# Patient Record
Sex: FEMALE | Race: WHITE | Employment: FULL TIME | URBAN - METROPOLITAN AREA
[De-identification: names, ages, dates, MRNs, and addresses within clinical notes are randomized per-mention and may not be internally consistent; named-entity substitution may affect disease eponyms.]

---

## 2017-03-29 ENCOUNTER — ANESTHESIA EVENT (OUTPATIENT)
Dept: SURGERY | Facility: CLINIC | Age: 57
End: 2017-03-29
Payer: COMMERCIAL

## 2017-04-17 RX ORDER — LIRAGLUTIDE 6 MG/ML
1.2 INJECTION SUBCUTANEOUS DAILY
COMMUNITY

## 2017-04-17 RX ORDER — SULFASALAZINE 500 MG/1
500 TABLET ORAL 2 TIMES DAILY
COMMUNITY

## 2017-04-19 ENCOUNTER — HOSPITAL ENCOUNTER (OUTPATIENT)
Facility: CLINIC | Age: 57
Discharge: HOME | End: 2017-04-19
Attending: ORTHOPAEDIC SURGERY | Admitting: ORTHOPAEDIC SURGERY
Payer: COMMERCIAL

## 2017-04-19 ENCOUNTER — ANESTHESIA (OUTPATIENT)
Dept: SURGERY | Facility: CLINIC | Age: 57
End: 2017-04-19
Payer: COMMERCIAL

## 2017-04-19 ENCOUNTER — SURGERY (OUTPATIENT)
Age: 57
End: 2017-04-19

## 2017-04-19 VITALS
BODY MASS INDEX: 44.47 KG/M2 | HEART RATE: 70 BPM | SYSTOLIC BLOOD PRESSURE: 117 MMHG | OXYGEN SATURATION: 96 % | WEIGHT: 276.68 LBS | DIASTOLIC BLOOD PRESSURE: 76 MMHG | RESPIRATION RATE: 18 BRPM | HEIGHT: 66 IN | TEMPERATURE: 98.1 F

## 2017-04-19 LAB
ABO + RH BLD: NORMAL
ABO + RH BLD: NORMAL
ANION GAP SERPL CALCULATED.3IONS-SCNC: 8 MMOL/L (ref 3–14)
BLD GP AB SCN SERPL QL: NORMAL
BLOOD BANK CMNT PATIENT-IMP: NORMAL
BUN SERPL-MCNC: 16 MG/DL (ref 7–30)
CALCIUM SERPL-MCNC: 8.4 MG/DL (ref 8.5–10.1)
CHLORIDE SERPL-SCNC: 106 MMOL/L (ref 94–109)
CO2 SERPL-SCNC: 24 MMOL/L (ref 20–32)
CREAT SERPL-MCNC: 0.59 MG/DL (ref 0.52–1.04)
GFR SERPL CREATININE-BSD FRML MDRD: ABNORMAL ML/MIN/1.7M2
GLUCOSE BLDC GLUCOMTR-MCNC: 209 MG/DL (ref 70–99)
GLUCOSE BLDC GLUCOMTR-MCNC: 222 MG/DL (ref 70–99)
GLUCOSE SERPL-MCNC: 160 MG/DL (ref 70–99)
GLUCOSE SERPL-MCNC: 221 MG/DL (ref 70–99)
HGB BLD-MCNC: 12.3 G/DL (ref 11.7–15.7)
MAGNESIUM SERPL-MCNC: 1.9 MG/DL (ref 1.6–2.3)
PHOSPHATE SERPL-MCNC: 3.4 MG/DL (ref 2.5–4.5)
POTASSIUM SERPL-SCNC: 4.1 MMOL/L (ref 3.4–5.3)
SODIUM SERPL-SCNC: 138 MMOL/L (ref 133–144)
SPECIMEN EXP DATE BLD: NORMAL

## 2017-04-19 PROCEDURE — 37000009 ZZH ANESTHESIA TECHNICAL FEE, EACH ADDTL 15 MIN: Performed by: ORTHOPAEDIC SURGERY

## 2017-04-19 PROCEDURE — 25000125 ZZHC RX 250: Performed by: STUDENT IN AN ORGANIZED HEALTH CARE EDUCATION/TRAINING PROGRAM

## 2017-04-19 PROCEDURE — 25000128 H RX IP 250 OP 636: Performed by: ORTHOPAEDIC SURGERY

## 2017-04-19 PROCEDURE — 40000170 ZZH STATISTIC PRE-PROCEDURE ASSESSMENT II: Performed by: ORTHOPAEDIC SURGERY

## 2017-04-19 PROCEDURE — 36000057 ZZH SURGERY LEVEL 3 1ST 30 MIN - UMMC: Performed by: ORTHOPAEDIC SURGERY

## 2017-04-19 PROCEDURE — 27210794 ZZH OR GENERAL SUPPLY STERILE: Performed by: ORTHOPAEDIC SURGERY

## 2017-04-19 PROCEDURE — 80048 BASIC METABOLIC PNL TOTAL CA: CPT | Performed by: STUDENT IN AN ORGANIZED HEALTH CARE EDUCATION/TRAINING PROGRAM

## 2017-04-19 PROCEDURE — 84100 ASSAY OF PHOSPHORUS: CPT | Performed by: STUDENT IN AN ORGANIZED HEALTH CARE EDUCATION/TRAINING PROGRAM

## 2017-04-19 PROCEDURE — 71000014 ZZH RECOVERY PHASE 1 LEVEL 2 FIRST HR: Performed by: ORTHOPAEDIC SURGERY

## 2017-04-19 PROCEDURE — 82962 GLUCOSE BLOOD TEST: CPT | Mod: 91

## 2017-04-19 PROCEDURE — 25000128 H RX IP 250 OP 636: Performed by: REGISTERED NURSE

## 2017-04-19 PROCEDURE — 36000059 ZZH SURGERY LEVEL 3 EA 15 ADDTL MIN UMMC: Performed by: ORTHOPAEDIC SURGERY

## 2017-04-19 PROCEDURE — 25000132 ZZH RX MED GY IP 250 OP 250 PS 637: Performed by: STUDENT IN AN ORGANIZED HEALTH CARE EDUCATION/TRAINING PROGRAM

## 2017-04-19 PROCEDURE — 86850 RBC ANTIBODY SCREEN: CPT | Performed by: STUDENT IN AN ORGANIZED HEALTH CARE EDUCATION/TRAINING PROGRAM

## 2017-04-19 PROCEDURE — 93010 ELECTROCARDIOGRAM REPORT: CPT | Performed by: INTERNAL MEDICINE

## 2017-04-19 PROCEDURE — 40000065 ZZH STATISTIC EKG NON-CHARGEABLE

## 2017-04-19 PROCEDURE — C9290 INJ, BUPIVACAINE LIPOSOME: HCPCS | Performed by: STUDENT IN AN ORGANIZED HEALTH CARE EDUCATION/TRAINING PROGRAM

## 2017-04-19 PROCEDURE — 27210995 ZZH RX 272: Performed by: ORTHOPAEDIC SURGERY

## 2017-04-19 PROCEDURE — 86901 BLOOD TYPING SEROLOGIC RH(D): CPT | Performed by: STUDENT IN AN ORGANIZED HEALTH CARE EDUCATION/TRAINING PROGRAM

## 2017-04-19 PROCEDURE — 85018 HEMOGLOBIN: CPT | Performed by: STUDENT IN AN ORGANIZED HEALTH CARE EDUCATION/TRAINING PROGRAM

## 2017-04-19 PROCEDURE — 25800025 ZZH RX 258: Performed by: REGISTERED NURSE

## 2017-04-19 PROCEDURE — 86900 BLOOD TYPING SEROLOGIC ABO: CPT | Performed by: STUDENT IN AN ORGANIZED HEALTH CARE EDUCATION/TRAINING PROGRAM

## 2017-04-19 PROCEDURE — 25000566 ZZH SEVOFLURANE, EA 15 MIN: Performed by: ORTHOPAEDIC SURGERY

## 2017-04-19 PROCEDURE — 71000027 ZZH RECOVERY PHASE 2 EACH 15 MINS: Performed by: ORTHOPAEDIC SURGERY

## 2017-04-19 PROCEDURE — 27110028 ZZH OR GENERAL SUPPLY NON-STERILE: Performed by: ORTHOPAEDIC SURGERY

## 2017-04-19 PROCEDURE — 25000128 H RX IP 250 OP 636: Performed by: STUDENT IN AN ORGANIZED HEALTH CARE EDUCATION/TRAINING PROGRAM

## 2017-04-19 PROCEDURE — 36415 COLL VENOUS BLD VENIPUNCTURE: CPT | Performed by: STUDENT IN AN ORGANIZED HEALTH CARE EDUCATION/TRAINING PROGRAM

## 2017-04-19 PROCEDURE — 36416 COLLJ CAPILLARY BLOOD SPEC: CPT | Performed by: STUDENT IN AN ORGANIZED HEALTH CARE EDUCATION/TRAINING PROGRAM

## 2017-04-19 PROCEDURE — 71000015 ZZH RECOVERY PHASE 1 LEVEL 2 EA ADDTL HR: Performed by: ORTHOPAEDIC SURGERY

## 2017-04-19 PROCEDURE — 83735 ASSAY OF MAGNESIUM: CPT | Performed by: STUDENT IN AN ORGANIZED HEALTH CARE EDUCATION/TRAINING PROGRAM

## 2017-04-19 PROCEDURE — 37000008 ZZH ANESTHESIA TECHNICAL FEE, 1ST 30 MIN: Performed by: ORTHOPAEDIC SURGERY

## 2017-04-19 PROCEDURE — C1713 ANCHOR/SCREW BN/BN,TIS/BN: HCPCS | Performed by: ORTHOPAEDIC SURGERY

## 2017-04-19 PROCEDURE — 82947 ASSAY GLUCOSE BLOOD QUANT: CPT | Performed by: STUDENT IN AN ORGANIZED HEALTH CARE EDUCATION/TRAINING PROGRAM

## 2017-04-19 PROCEDURE — 25000125 ZZHC RX 250: Performed by: REGISTERED NURSE

## 2017-04-19 DEVICE — IMP ANCHOR ARTHREX BIO-SWIVELOCK 4.75X22MM AR-2324BCC-2: Type: IMPLANTABLE DEVICE | Site: SHOULDER | Status: FUNCTIONAL

## 2017-04-19 DEVICE — IMP ANCHOR ARTHREX 5.5MM BIOCOMP CRKSCR TIGRTL AR-1927BCFT: Type: IMPLANTABLE DEVICE | Site: SHOULDER | Status: FUNCTIONAL

## 2017-04-19 RX ORDER — ACETAMINOPHEN 325 MG/1
975 TABLET ORAL ONCE
Status: COMPLETED | OUTPATIENT
Start: 2017-04-19 | End: 2017-04-19

## 2017-04-19 RX ORDER — FENTANYL CITRATE 50 UG/ML
INJECTION, SOLUTION INTRAMUSCULAR; INTRAVENOUS PRN
Status: DISCONTINUED | OUTPATIENT
Start: 2017-04-19 | End: 2017-04-19

## 2017-04-19 RX ORDER — DEXAMETHASONE SODIUM PHOSPHATE 4 MG/ML
INJECTION, SOLUTION INTRA-ARTICULAR; INTRALESIONAL; INTRAMUSCULAR; INTRAVENOUS; SOFT TISSUE PRN
Status: DISCONTINUED | OUTPATIENT
Start: 2017-04-19 | End: 2017-04-19

## 2017-04-19 RX ORDER — HYDROXYZINE PAMOATE 25 MG/1
25 CAPSULE ORAL 3 TIMES DAILY PRN
Qty: 30 CAPSULE | Refills: 0 | Status: SHIPPED | OUTPATIENT
Start: 2017-04-19 | End: 2019-12-13

## 2017-04-19 RX ORDER — HYDROMORPHONE HYDROCHLORIDE 1 MG/ML
.3-.5 INJECTION, SOLUTION INTRAMUSCULAR; INTRAVENOUS; SUBCUTANEOUS EVERY 10 MIN PRN
Status: DISCONTINUED | OUTPATIENT
Start: 2017-04-19 | End: 2017-04-19 | Stop reason: HOSPADM

## 2017-04-19 RX ORDER — BUPIVACAINE HYDROCHLORIDE AND EPINEPHRINE 2.5; 5 MG/ML; UG/ML
INJECTION, SOLUTION INFILTRATION; PERINEURAL PRN
Status: DISCONTINUED | OUTPATIENT
Start: 2017-04-19 | End: 2017-04-19

## 2017-04-19 RX ORDER — GABAPENTIN 300 MG/1
300 CAPSULE ORAL ONCE
Status: COMPLETED | OUTPATIENT
Start: 2017-04-19 | End: 2017-04-19

## 2017-04-19 RX ORDER — ONDANSETRON 2 MG/ML
4 INJECTION INTRAMUSCULAR; INTRAVENOUS EVERY 30 MIN PRN
Status: DISCONTINUED | OUTPATIENT
Start: 2017-04-19 | End: 2017-04-19 | Stop reason: HOSPADM

## 2017-04-19 RX ORDER — HYDROMORPHONE HYDROCHLORIDE 2 MG/1
2-4 TABLET ORAL EVERY 4 HOURS PRN
Qty: 60 TABLET | Refills: 0 | Status: SHIPPED | OUTPATIENT
Start: 2017-04-19 | End: 2019-12-13

## 2017-04-19 RX ORDER — ONDANSETRON 4 MG/1
4 TABLET, ORALLY DISINTEGRATING ORAL EVERY 30 MIN PRN
Status: DISCONTINUED | OUTPATIENT
Start: 2017-04-19 | End: 2017-04-19 | Stop reason: HOSPADM

## 2017-04-19 RX ORDER — SODIUM CHLORIDE, SODIUM LACTATE, POTASSIUM CHLORIDE, CALCIUM CHLORIDE 600; 310; 30; 20 MG/100ML; MG/100ML; MG/100ML; MG/100ML
INJECTION, SOLUTION INTRAVENOUS CONTINUOUS PRN
Status: DISCONTINUED | OUTPATIENT
Start: 2017-04-19 | End: 2017-04-19

## 2017-04-19 RX ORDER — NALOXONE HYDROCHLORIDE 0.4 MG/ML
.1-.4 INJECTION, SOLUTION INTRAMUSCULAR; INTRAVENOUS; SUBCUTANEOUS
Status: DISCONTINUED | OUTPATIENT
Start: 2017-04-19 | End: 2017-04-19 | Stop reason: HOSPADM

## 2017-04-19 RX ORDER — SODIUM CHLORIDE, SODIUM LACTATE, POTASSIUM CHLORIDE, CALCIUM CHLORIDE 600; 310; 30; 20 MG/100ML; MG/100ML; MG/100ML; MG/100ML
INJECTION, SOLUTION INTRAVENOUS CONTINUOUS
Status: DISCONTINUED | OUTPATIENT
Start: 2017-04-19 | End: 2017-04-19 | Stop reason: HOSPADM

## 2017-04-19 RX ORDER — MEPERIDINE HYDROCHLORIDE 25 MG/ML
12.5 INJECTION INTRAMUSCULAR; INTRAVENOUS; SUBCUTANEOUS
Status: DISCONTINUED | OUTPATIENT
Start: 2017-04-19 | End: 2017-04-19 | Stop reason: HOSPADM

## 2017-04-19 RX ORDER — CEFAZOLIN SODIUM 1 G/50ML
3 SOLUTION INTRAVENOUS
Status: COMPLETED | OUTPATIENT
Start: 2017-04-19 | End: 2017-04-19

## 2017-04-19 RX ORDER — MORPHINE SULFATE 15 MG/1
15-30 TABLET, FILM COATED, EXTENDED RELEASE ORAL 2 TIMES DAILY
Qty: 20 TABLET | Refills: 0 | Status: SHIPPED | OUTPATIENT
Start: 2017-04-19 | End: 2019-12-13

## 2017-04-19 RX ORDER — FENTANYL CITRATE 50 UG/ML
25-50 INJECTION, SOLUTION INTRAMUSCULAR; INTRAVENOUS
Status: DISCONTINUED | OUTPATIENT
Start: 2017-04-19 | End: 2017-04-19 | Stop reason: HOSPADM

## 2017-04-19 RX ORDER — FLUMAZENIL 0.1 MG/ML
0.2 INJECTION, SOLUTION INTRAVENOUS
Status: DISCONTINUED | OUTPATIENT
Start: 2017-04-19 | End: 2017-04-19 | Stop reason: HOSPADM

## 2017-04-19 RX ORDER — EPHEDRINE SULFATE 50 MG/ML
INJECTION, SOLUTION INTRAMUSCULAR; INTRAVENOUS; SUBCUTANEOUS PRN
Status: DISCONTINUED | OUTPATIENT
Start: 2017-04-19 | End: 2017-04-19

## 2017-04-19 RX ORDER — CEFAZOLIN SODIUM 1 G/3ML
1 INJECTION, POWDER, FOR SOLUTION INTRAMUSCULAR; INTRAVENOUS SEE ADMIN INSTRUCTIONS
Status: DISCONTINUED | OUTPATIENT
Start: 2017-04-19 | End: 2017-04-19 | Stop reason: HOSPADM

## 2017-04-19 RX ORDER — LIDOCAINE HYDROCHLORIDE 20 MG/ML
INJECTION, SOLUTION INFILTRATION; PERINEURAL PRN
Status: DISCONTINUED | OUTPATIENT
Start: 2017-04-19 | End: 2017-04-19

## 2017-04-19 RX ORDER — BUPIVACAINE HYDROCHLORIDE AND EPINEPHRINE 5; 5 MG/ML; UG/ML
INJECTION, SOLUTION PERINEURAL PRN
Status: DISCONTINUED | OUTPATIENT
Start: 2017-04-19 | End: 2017-04-19

## 2017-04-19 RX ORDER — ONDANSETRON 2 MG/ML
INJECTION INTRAMUSCULAR; INTRAVENOUS PRN
Status: DISCONTINUED | OUTPATIENT
Start: 2017-04-19 | End: 2017-04-19

## 2017-04-19 RX ORDER — PROPOFOL 10 MG/ML
INJECTION, EMULSION INTRAVENOUS PRN
Status: DISCONTINUED | OUTPATIENT
Start: 2017-04-19 | End: 2017-04-19

## 2017-04-19 RX ADMIN — PROPOFOL 200 MG: 10 INJECTION, EMULSION INTRAVENOUS at 08:51

## 2017-04-19 RX ADMIN — FENTANYL CITRATE 50 MCG: 50 INJECTION, SOLUTION INTRAMUSCULAR; INTRAVENOUS at 10:25

## 2017-04-19 RX ADMIN — DEXAMETHASONE SODIUM PHOSPHATE 10 MG: 4 INJECTION, SOLUTION INTRAMUSCULAR; INTRAVENOUS at 09:15

## 2017-04-19 RX ADMIN — EPINEPHRINE 3000 ML: 1 INJECTION, SOLUTION, CONCENTRATE INTRAVENOUS at 09:43

## 2017-04-19 RX ADMIN — FENTANYL CITRATE 50 MCG: 50 INJECTION, SOLUTION INTRAMUSCULAR; INTRAVENOUS at 09:00

## 2017-04-19 RX ADMIN — MIDAZOLAM HYDROCHLORIDE 1 MG: 1 INJECTION, SOLUTION INTRAMUSCULAR; INTRAVENOUS at 08:37

## 2017-04-19 RX ADMIN — DEXMEDETOMIDINE 12 MCG: 100 INJECTION, SOLUTION, CONCENTRATE INTRAVENOUS at 09:05

## 2017-04-19 RX ADMIN — Medication 5 MG: at 10:08

## 2017-04-19 RX ADMIN — EPINEPHRINE 3000 ML: 1 INJECTION, SOLUTION, CONCENTRATE INTRAVENOUS at 09:50

## 2017-04-19 RX ADMIN — Medication 1 TABLET: at 12:29

## 2017-04-19 RX ADMIN — SODIUM CHLORIDE, POTASSIUM CHLORIDE, SODIUM LACTATE AND CALCIUM CHLORIDE: 600; 310; 30; 20 INJECTION, SOLUTION INTRAVENOUS at 10:22

## 2017-04-19 RX ADMIN — ACETAMINOPHEN 975 MG: 325 TABLET, FILM COATED ORAL at 07:20

## 2017-04-19 RX ADMIN — HUMAN INSULIN 2 UNITS: 100 INJECTION, SOLUTION SUBCUTANEOUS at 12:35

## 2017-04-19 RX ADMIN — DEXMEDETOMIDINE 4 MCG: 100 INJECTION, SOLUTION, CONCENTRATE INTRAVENOUS at 09:59

## 2017-04-19 RX ADMIN — Medication 3 G: at 09:05

## 2017-04-19 RX ADMIN — LIDOCAINE HYDROCHLORIDE 40 MG: 20 INJECTION, SOLUTION INFILTRATION; PERINEURAL at 08:51

## 2017-04-19 RX ADMIN — BUPIVACAINE 10 ML: 13.3 INJECTION, SUSPENSION, LIPOSOMAL INFILTRATION at 08:20

## 2017-04-19 RX ADMIN — PROPOFOL 50 MG: 10 INJECTION, EMULSION INTRAVENOUS at 08:54

## 2017-04-19 RX ADMIN — MIDAZOLAM HYDROCHLORIDE 1 MG: 1 INJECTION, SOLUTION INTRAMUSCULAR; INTRAVENOUS at 08:19

## 2017-04-19 RX ADMIN — Medication 5 MG: at 09:47

## 2017-04-19 RX ADMIN — Medication 1 TABLET: at 13:06

## 2017-04-19 RX ADMIN — SODIUM CHLORIDE, POTASSIUM CHLORIDE, SODIUM LACTATE AND CALCIUM CHLORIDE: 600; 310; 30; 20 INJECTION, SOLUTION INTRAVENOUS at 08:37

## 2017-04-19 RX ADMIN — PROPOFOL 30 MG: 10 INJECTION, EMULSION INTRAVENOUS at 09:05

## 2017-04-19 RX ADMIN — EPINEPHRINE 2700 ML: 1 INJECTION, SOLUTION, CONCENTRATE INTRAVENOUS at 10:10

## 2017-04-19 RX ADMIN — Medication 5 MG: at 09:46

## 2017-04-19 RX ADMIN — Medication 10 MG: at 09:11

## 2017-04-19 RX ADMIN — BUPIVACAINE HYDROCHLORIDE AND EPINEPHRINE BITARTRATE 10 ML: 5; .005 INJECTION, SOLUTION PERINEURAL at 08:20

## 2017-04-19 RX ADMIN — PHENYLEPHRINE HYDROCHLORIDE 0.3 MCG/KG/MIN: 10 INJECTION, SOLUTION INTRAMUSCULAR; INTRAVENOUS; SUBCUTANEOUS at 09:07

## 2017-04-19 RX ADMIN — EPINEPHRINE 3000 ML: 1 INJECTION, SOLUTION, CONCENTRATE INTRAVENOUS at 10:05

## 2017-04-19 RX ADMIN — BUPIVACAINE HYDROCHLORIDE AND EPINEPHRINE BITARTRATE 5 ML: 2.5; .005 INJECTION, SOLUTION INFILTRATION; PERINEURAL at 08:20

## 2017-04-19 RX ADMIN — Medication 100 MG: at 08:52

## 2017-04-19 RX ADMIN — ONDANSETRON 4 MG: 2 INJECTION INTRAMUSCULAR; INTRAVENOUS at 10:18

## 2017-04-19 RX ADMIN — FENTANYL CITRATE 50 MCG: 50 INJECTION INTRAMUSCULAR; INTRAVENOUS at 08:18

## 2017-04-19 RX ADMIN — GABAPENTIN 300 MG: 300 CAPSULE ORAL at 07:20

## 2017-04-19 RX ADMIN — MIDAZOLAM HYDROCHLORIDE 1 MG: 1 INJECTION, SOLUTION INTRAMUSCULAR; INTRAVENOUS at 08:42

## 2017-04-19 RX ADMIN — FENTANYL CITRATE 100 MCG: 50 INJECTION, SOLUTION INTRAMUSCULAR; INTRAVENOUS at 08:51

## 2017-04-19 RX ADMIN — EPINEPHRINE 3000 ML: 1 INJECTION, SOLUTION, CONCENTRATE INTRAVENOUS at 09:59

## 2017-04-19 RX ADMIN — FENTANYL CITRATE 50 MCG: 50 INJECTION, SOLUTION INTRAMUSCULAR; INTRAVENOUS at 10:17

## 2017-04-19 NOTE — IP AVS SNAPSHOT
[facility]    [address] [facility] [address]    Phone:  [phone]                                       After Visit Summary   4/19/2017    Monika Frazier    MRN: [id number]           After Visit Summary Signature Page     I have received my discharge instructions, and my questions have been answered. I have discussed any challenges I see with this plan with the nurse or doctor.    ..........................................................................................................................................  Patient/Patient Representative Signature      ..........................................................................................................................................  Patient Representative Print Name and Relationship to Patient    ..................................................               ................................................  Date                                            Time    ..........................................................................................................................................  Reviewed by Signature/Title    ...................................................              ..............................................  Date                                                            Time

## 2017-04-19 NOTE — OR NURSING
Labs reviewed with Dr. Jack. New order for 1 time of insulin and plan to recheck BS prior to pt discharging to home. Pt started on her home med dilaudid. Will transfer to phase 2 when bed is available.

## 2017-04-19 NOTE — ANESTHESIA PREPROCEDURE EVALUATION
Anesthesia Evaluation     . Pt has had prior anesthetic. Type: General    History of anesthetic complications    panic attack      ROS/MED HX    ENT/Pulmonary:     (+)sleep apnea, , . .    Neurologic:  - neg neurologic ROS     Cardiovascular:     (+) Dyslipidemia, hypertension----. : . . . :. .       METS/Exercise Tolerance:     Hematologic:         Musculoskeletal:         GI/Hepatic:     (+) GERD       Renal/Genitourinary:         Endo:     (+) type II DM .      Psychiatric:  - neg psychiatric ROS       Infectious Disease:         Malignancy:         Other:                     Physical Exam  Normal systems: cardiovascular and pulmonary    Airway   Mallampati: III  TM distance: >3 FB  Neck ROM: full    Dental     Cardiovascular   Rhythm and rate: regular and normal      Pulmonary    breath sounds clear to auscultation                    Anesthesia Plan      History & Physical Review  History and physical reviewed and following examination; no interval change.    ASA Status:  3 .    NPO Status:  > 6 hours    Plan for General, ETT and Periph. Nerve Block for postop pain with Intravenous and Propofol induction. Maintenance will be Balanced.    PONV prophylaxis:  Ondansetron (or other 5HT-3) and Dexamethasone or Solumedrol       Postoperative Care  Postoperative pain management:  IV analgesics, Oral pain medications and Multi-modal analgesia.      Consents  Anesthetic plan, risks, benefits and alternatives discussed with:  Patient..          ANESTHESIA PREOP EVALUATION    PROCEDURE: Procedure(s):  Left Rotator Cuff Repair Arthroscopic, Subacromial Decompression (choice anesthesia)  - Wound Class:     HPI: Monika Frazier is a 56 year old female who presents for above procedure. Hx of previous interscalene block    12/23/15; 0804; Mask Ventilation: Easy; Ease of Intubation: Easy; Airway Size: 7.5;  Cuffed;  Oral;  Blade Type: Busby;  Blade Size: 2;  Insertion Attempts: 1;  Breath Sounds: Equal, clear and bilateral;   End Tidal CO2: Present;  Dentition: Intact    PAST MEDICAL HISTORY:    Past Medical History:   Diagnosis Date     ADD (attention deficit disorder)      Adenomatous colon polyp      Anemia     Iron deficiency     Arthritis     Rheumatoid     Complication of anesthesia     panic attack post op     Depression      Diabetes (H)     Type 2     GERD (gastroesophageal reflux disease)      Hypertension      Liver disease     fatty liver disease     Osteoarthritis      Other chronic pain      Sleep apnea     uses CPAP       PAST SURGICAL HISTORY:    Past Surgical History:   Procedure Laterality Date     ARTHROSCOPY SHOULDER BICEPS TENODESIS REPAIR Right 12/23/2015    Procedure: ARTHROSCOPY SHOULDER BICEPS TENODESIS REPAIR;  Surgeon: Emerson Rucker MD;  Location: [facility]     ARTHROSCOPY SHOULDER ROTATOR CUFF REPAIR Right 12/23/2015    Procedure: ARTHROSCOPY SHOULDER ROTATOR CUFF REPAIR;  Surgeon: Emerson Rucker MD;  Location: [facility]     BACK SURGERY      Lumbar spine decompression     GYN SURGERY      Hysterectomy     ORTHOPEDIC SURGERY Left     Foot surgery       PAST ANESTHESIA HISTORY:     No personal or family h/o anesthesia problems    SOCIAL HISTORY:       Social History   Substance Use Topics     Smoking status: Never Smoker     Smokeless tobacco: Not on file     Alcohol use Yes      Comment: rare       ALLERGIES:     Allergies   Allergen Reactions     Ketoprofen Hives     Pravastatin      Myalgias     Simvastatin      Myalgias       MEDICATIONS:     No prescriptions prior to admission.       Current Outpatient Prescriptions   Medication Sig Dispense Refill     adalimumab (HUMIRA) 40 MG/0.8ML prefilled syringe kit Inject 40 mg Subcutaneous every 14 days       liraglutide (VICTOZA) 18 MG/3ML soln Inject 1.2 mg Subcutaneous daily       sulfaSALAzine (AZULFIDINE) 500 MG tablet Take 500 mg by mouth 2 times daily 2 in am and 2 pm       FLUOXETINE HCL PO Take 20 mg by mouth daily       Celecoxib (CELEBREX  PO) Take 200 mg by mouth daily as needed for moderate pain       LISINOPRIL PO Take 20 mg by mouth daily       metFORMIN (GLUCOPHAGE-XR) 500 MG 24 hr tablet Take 1,000 mg by mouth 2 times daily (with meals)       Multiple Vitamins-Minerals (MULTIVITAMIN PO) Take 1 tablet by mouth daily       Omega-3 Fatty Acids (OMEGA-3 FISH OIL PO) Take 1 g by mouth daily       Omeprazole Magnesium (PRILOSEC OTC PO) Take 20 mg by mouth daily       TraMADol HCl (ULTRAM PO) Take  mg by mouth every 6 hours as needed for pain       TRAZODONE HCL PO Take 50 mg by mouth 1-2 tablets nightly       Glucose Blood (ACCU-CHEK STACEY VI)        fluticasone (FLONASE) 50 MCG/ACT nasal spray Spray 2 sprays into both nostrils daily as needed for rhinitis or allergies       insulin pen needle 32G X 4 MM Use ** pen needles daily or as directed.       blood glucose monitoring (ACCU-CHEK MULTICLIX) lancets 1 each by In Vitro route Use to test blood sugar ** times daily or as directed.         No current Epic-ordered facility-administered medications on file.      Current Outpatient Prescriptions Ordered in [facility]   Medication Sig Dispense Refill     adalimumab (HUMIRA) 40 MG/0.8ML prefilled syringe kit Inject 40 mg Subcutaneous every 14 days       liraglutide (VICTOZA) 18 MG/3ML soln Inject 1.2 mg Subcutaneous daily       sulfaSALAzine (AZULFIDINE) 500 MG tablet Take 500 mg by mouth 2 times daily 2 in am and 2 pm       FLUOXETINE HCL PO Take 20 mg by mouth daily       Celecoxib (CELEBREX PO) Take 200 mg by mouth daily as needed for moderate pain       LISINOPRIL PO Take 20 mg by mouth daily       metFORMIN (GLUCOPHAGE-XR) 500 MG 24 hr tablet Take 1,000 mg by mouth 2 times daily (with meals)       Multiple Vitamins-Minerals (MULTIVITAMIN PO) Take 1 tablet by mouth daily       Omega-3 Fatty Acids (OMEGA-3 FISH OIL PO) Take 1 g by mouth daily       Omeprazole Magnesium (PRILOSEC OTC PO) Take 20 mg by mouth daily       TraMADol HCl (ULTRAM PO) Take   mg by mouth every 6 hours as needed for pain       TRAZODONE HCL PO Take 50 mg by mouth 1-2 tablets nightly       Glucose Blood (ACCU-CHEK STACEY VI)        fluticasone (FLONASE) 50 MCG/ACT nasal spray Spray 2 sprays into both nostrils daily as needed for rhinitis or allergies       insulin pen needle 32G X 4 MM Use ** pen needles daily or as directed.       blood glucose monitoring (ACCU-CHEK MULTICLIX) lancets 1 each by In Vitro route Use to test blood sugar ** times daily or as directed.         PHYSICAL EXAM:    Vitals: T Data Unavailable, P Data Unavailable, BP Data Unavailable, R Data Unavailable, SpO2  , Weight   Wt Readings from Last 2 Encounters:   12/23/15 120.1 kg (264 lb 11.2 oz)       See doc flowsheet      No Data Recorded    No Data Recorded    No Data Recorded    No Data Recorded    No Data Recorded    No data recorded.  No data recorded.      NPO STATUS: see doc flowsheet    LABS:    BMP:  No results for input(s): NA, POTASSIUM, CHLORIDE, CO2, BUN, CR, GLC, FABIOLA in the last 62304 hours.    LFTs:   No results for input(s): PROTTOTAL, ALBUMIN, BILITOTAL, ALKPHOS, AST, ALT, BILIDIRECT in the last 43786 hours.    CBC:   Recent Labs   Lab Test  12/23/15   0632   HGB  11.4*       Coags:  No results for input(s): INR, PTT, FIBR in the last 79983 hours.    Imaging:  No orders to display       Israel Jack MD  Anesthesiology Staff  Pager [phone]    4/18/2017  8:40 PM

## 2017-04-19 NOTE — IP AVS SNAPSHOT
MRN:[id number]                      After Visit Summary   4/19/2017    Monika Frazier    MRN: [id number]           Thank you!     Thank you for choosing [facility] for your care. Our goal is always to provide you with excellent care. Hearing back from our patients is one way we can continue to improve our services. Please take a few minutes to complete the written survey that you may receive in the mail after you visit with us. Thank you!        Patient Information     Date Of Birth          1960        About your hospital stay     You were admitted on:  April 19, 2017 You last received care in the:   PACU    You were discharged on:  April 19, 2017       Who to Call     For medical emergencies, please call 911.  For non-urgent questions about your medical care, please call your primary care provider or clinic, [phone]  For questions related to your surgery, please call your surgery clinic        Attending Provider     Provider Emerson Simms MD Orthopedics       Primary Care Provider Office Phone # Fax #    Courtney Barrios [phone]       [facility] [place] [address] [address] [address]        After Care Instructions     Discharge Instructions       Dr. Rucker's Discharge Instructions: Shoulder Arthroscopy     Activities: You will be provided with a sling. Wear the sling at all times. Typically you will need to wear the sling for at least 6 weeks. Your surgeon will let you know if there are any additional recommendations. An ice pack may be used for pain relief, along with the prescription pain pills that were prescribed.    You will be taught supine passive external rotation at the side 0-20 degree exercises.  Please do these twice per day.    Do Not Take Anti-inflammatories/NSAIDs (Ibuprofen, Aleve, Motrin, etc.)    Wound Care: You may remove your dressings in 4 days and shower. You may wet the wounds in the shower, but do  "not take baths. Redress the wounds with bandaids. Leave the steri strips (sticky tapes) in place.     Follow-up within 10-14 days. Call for an appointment. [phone] ([facility] or [facility]) or [phone] ([facility]).                  Further instructions from your care team       Information about Liposomal bupivacaine (Exparel)    What is Liposomal bupivacaine?    Exparel is a numbing medication that can help you manage your pain after surgery.  This medication is similar to \"novacaine,\" which is often used by the dentist.  Exparel is released slowly and can help control pain for up to 72 hours.    What is the purpose of Liposomal bupivacaine ?    To manage your pain after surgery    To help you sleep better, take deep breaths, walk more comfortable, and feel up to visiting with others    How is the procedure done?    Liposomal bupivacaine medication by an injection.    It is usually given while you are under sedation right before your surgery.  If this is the case, you will be awake, but you should not experience any pain during the procedure.    For some people, the injection may be given at the very end of you surgery.  It all depends on the type of surgery and your situation.    The procedure usually takes about 15 minutes.  An ultrasound machine will help the anesthesiologist insert it in the right place.    A needle is used to place the numbing medication under your skin.  It provides pain relief by numbing the tissue in the area where your surgeon will make the incision.    What can I expect?    You may experience numbness, tingling, or a feeling of heaviness around the area that was injected.    The anesthesia team will check on you every day, for 3 days while you are in the hospital.    Let your nurse or anesthesia team know if you experience:       Numbness or tingling occurs in areas other than around the tubing     Blurry vision    Ringing in your ears    A metallic taste in your mouth    If you " go home before the end of the 3 days, and experience any of the above symptoms, IMMEDIATELY CALL YOUR ANESTHESIOLOGIST:      Call: Dial [phone].or call [phone]  Let the  know why you are calling and ask them to contact the anesthesiologist right away for you.    You should not receive any other type of numbing medication within 4 days after receiving Liposomal bupivacaine unless your anesthesiologist approves.                Exp 9/2017        Discharge Instructions: Arthroscopy of the Shoulder    Diet    You have no restrictions in your diet.    During the evening following surgery, drink plenty of fluids and eat a light supper.   Nausea    The anesthesia you received may produce some nausea.     If nauseated, stay in bed, keep your head down, and try drinking fluids such as 7-up, tea, or soup.  Discomfort    The amount of discomfort you feel is very unpredictable.    If you have pain that cannot be controlled with the prescription you may have been given, you should notify your doctor.     Some residual swelling and discomfort may occur for one or two weeks.    Applying an ice pack for 10 minutes at a time may help relieve pain and reduce swelling.     When applying the ice, be sure your shoulder dressing does not get wet. You can place plastic over the shoulder prior to applying the ice pack.   Drainage    You may expect drainage from the incisions on your shoulder.    Change the outer dressing in 3 days.    You may change the Band-Aids if they get soiled or wet. If you have white steri-strips across the incisions, leave them in place. They will fall off on their own in 7-10 days.     Activity    Wear sling if instructed by your doctor.    No active sports, rotating of the shoulder or heavy lifting are advised for one week after surgery.    You may not drive the day of surgery because of the effects of anesthesia.     You can go back to work or school provided no problems such as significant  increase or pain/swelling arise.     You may shower the day after surgery. Do not rub the incisions and pat dry.  Call your doctor if:    You have a large amount of bleeding drainage or severe pain.                                                                                                                                                                                                                                                                                                           REV.5/12    Same-Day Surgery   Adult Discharge Orders & Instructions     For 24 hours after surgery:  1. Get plenty of rest.  A responsible adult must stay with you for at least 24 hours after you leave the hospital.   2. Pain medication can slow your reflexes. Do not drive or use heavy equipment.  If you have weakness or tingling, don't drive or use heavy equipment until this feeling goes away.  3. Mixing alcohol and pain medication can cause dizziness and slow your breathing. It can even be fatal. Do not drink alcohol while taking pain medication.  4. Avoid strenuous or risky activities.  Ask for help when climbing stairs.   5. You may feel lightheaded.  If so, sit for a few minutes before standing.  Have someone help you get up.   6. If you have nausea (feel sick to your stomach), drink only clear liquids such as apple juice, ginger ale, broth or 7-Up.  Rest may also help.  Be sure to drink enough fluids.  Move to a regular diet as you feel able. Take pain medications with a small amount of solid food, such as toast or crackers, to avoid nausea.   7. A slight fever is normal. Call the doctor if your fever is over 100 F (37.7 C) (taken under the tongue) or lasts longer than 24 hours.  8. You may have a dry mouth, muscle aches, trouble sleeping or a sore throat.  These symptoms should go away after 24 hours.  9. Do not make important or legal decisions.   Pain Management:      1. Take pain medication (if prescribed) for pain as  "directed by your physician.        2. WARNING: If the pain medication you have been prescribed contains Tylenol  (acetaminophen), DO NOT take additional doses of Tylenol (acetaminophen).     Call your doctor for any of the followin.  Signs of infection (fever, growing tenderness at the surgery site, severe pain, a large amount of drainage or bleeding, foul-smelling drainage, redness, swelling).    2.  It has been over 8 to 10 hours since surgery and you are still not able to urinate (pee).    3.  Headache for over 24 hours.    4.  Numbness, tingling or weakness the day after surgery (if you had spinal anesthesia).  To contact a doctor, call _____________________________________ or:      [phone] and ask for the Resident On Call for:          __________________________________________ (answered 24 hours a day)      Emergency Department:  [facility]: [phone]  [facility]: [phone]               Rev. 10/2014         Pending Results     No orders found from 2017 to 2017.            Admission Information     Date & Time Provider Department Dept. Phone    2017 Emerson Rucker MD  PACU [phone]      Your Vitals Were     Blood Pressure Pulse Temperature Respirations Height Weight    125/74 70 97.5  F (36.4  C) (Oral) 16 1.676 m (5' 6\") 125.5 kg (276 lb 10.8 oz)    Pulse Oximetry BMI (Body Mass Index)                94% 44.66 kg/m2          [employer] Information     [employer] lets you send messages to your doctor, view your test results, renew your prescriptions, schedule appointments and more. To sign up, go to [email or web address] . Click on \"Log in\" on the left side of the screen, which will take you to the Welcome page. Then click on \"Sign up Now\" on the right side of the page.     You will be asked to enter the access code listed below, as well as some personal information. Please follow the directions to create your username and " password.     Your access code is: [email or web address]  Expires: 2017 12:21 PM     Your access code will  in 90 days. If you need help or a new code, please call your [facility] or [phone].        Care EveryWhere ID     This is your Care EveryWhere ID. This could be used by other organizations to access your [facility] medical records  [id number]           Review of your medicines      START taking        Dose / Directions    HYDROmorphone 2 MG tablet   Commonly known as:  DILAUDID        Dose:  2-4 mg   Take 1-2 tablets (2-4 mg) by mouth every 4 hours as needed for moderate to severe pain   Quantity:  60 tablet   Refills:  0       hydrOXYzine 25 MG capsule   Commonly known as:  VISTARIL        Dose:  25 mg   Take 1 capsule (25 mg) by mouth 3 times daily as needed for itching   Quantity:  30 capsule   Refills:  0       morphine 15 MG 12 hr tablet   Commonly known as:  MS CONTIN        Dose:  15-30 mg   Take 1-2 tablets (15-30 mg) by mouth 2 times daily   Quantity:  20 tablet   Refills:  0         CONTINUE these medicines which have NOT CHANGED        Dose / Directions    ACCU-CHEK STACEY VI        Refills:  0       adalimumab 40 MG/0.8ML prefilled syringe kit   Commonly known as:  HUMIRA        Dose:  40 mg   Inject 40 mg Subcutaneous every 14 days   Refills:  0       blood glucose monitoring lancets        Dose:  1 each   1 each by In Vitro route Use to test blood sugar *** times daily or as directed.   Refills:  0       CELEBREX PO        Dose:  200 mg   Take 200 mg by mouth daily as needed for moderate pain   Refills:  0       FLUOXETINE HCL PO        Dose:  20 mg   Take 20 mg by mouth daily   Refills:  0       fluticasone 50 MCG/ACT spray   Commonly known as:  FLONASE        Dose:  2 spray   Spray 2 sprays into both nostrils daily as needed for rhinitis or allergies   Refills:  0       insulin pen needle 32G X 4 MM        Use *** pen needles daily or as directed.   Refills:  0       liraglutide 18  MG/3ML soln   Commonly known as:  VICTOZA        Dose:  1.2 mg   Inject 1.2 mg Subcutaneous daily   Refills:  0       LISINOPRIL PO        Dose:  20 mg   Take 20 mg by mouth daily   Refills:  0       metFORMIN 500 MG 24 hr tablet   Commonly known as:  GLUCOPHAGE-XR        Dose:  1000 mg   Take 1,000 mg by mouth 2 times daily (with meals)   Refills:  0       MULTIVITAMIN PO        Dose:  1 tablet   Take 1 tablet by mouth daily   Refills:  0       OMEGA-3 FISH OIL PO        Dose:  1 g   Take 1 g by mouth daily   Refills:  0       PRILOSEC OTC PO        Dose:  20 mg   Take 20 mg by mouth daily   Refills:  0       sulfaSALAzine 500 MG tablet   Commonly known as:  AZULFIDINE        Dose:  500 mg   Take 500 mg by mouth 2 times daily 2 in am and 2 pm   Refills:  0       TRAZODONE HCL PO        Dose:  50 mg   Take 50 mg by mouth 1-2 tablets nightly   Refills:  0       ULTRAM PO        Take  mg by mouth every 6 hours as needed for pain   Refills:  0            Where to get your medicines      Some of these will need a paper prescription and others can be bought over the counter. Ask your nurse if you have questions.     Bring a paper prescription for each of these medications     HYDROmorphone 2 MG tablet    hydrOXYzine 25 MG capsule    morphine 15 MG 12 hr tablet                Protect others around you: Learn how to safely use, store and throw away your medicines at www.disposemymeds.org.             Medication List: This is a list of all your medications and when to take them. Check marks below indicate your daily home schedule. Keep this list as a reference.      Medications           Morning Afternoon Evening Bedtime As Needed    ACCU-CHEK STACEY VI                                adalimumab 40 MG/0.8ML prefilled syringe kit   Commonly known as:  HUMIRA   Inject 40 mg Subcutaneous every 14 days                                blood glucose monitoring lancets   1 each by In Vitro route Use to test blood sugar ***  times daily or as directed.                                CELEBREX PO   Take 200 mg by mouth daily as needed for moderate pain                                FLUOXETINE HCL PO   Take 20 mg by mouth daily                                fluticasone 50 MCG/ACT spray   Commonly known as:  FLONASE   Spray 2 sprays into both nostrils daily as needed for rhinitis or allergies                                HYDROmorphone 2 MG tablet   Commonly known as:  DILAUDID   Take 1-2 tablets (2-4 mg) by mouth every 4 hours as needed for moderate to severe pain                                hydrOXYzine 25 MG capsule   Commonly known as:  VISTARIL   Take 1 capsule (25 mg) by mouth 3 times daily as needed for itching                                insulin pen needle 32G X 4 MM   Use *** pen needles daily or as directed.                                liraglutide 18 MG/3ML soln   Commonly known as:  VICTOZA   Inject 1.2 mg Subcutaneous daily                                LISINOPRIL PO   Take 20 mg by mouth daily                                metFORMIN 500 MG 24 hr tablet   Commonly known as:  GLUCOPHAGE-XR   Take 1,000 mg by mouth 2 times daily (with meals)                                morphine 15 MG 12 hr tablet   Commonly known as:  MS CONTIN   Take 1-2 tablets (15-30 mg) by mouth 2 times daily                                MULTIVITAMIN PO   Take 1 tablet by mouth daily                                OMEGA-3 FISH OIL PO   Take 1 g by mouth daily                                PRILOSEC OTC PO   Take 20 mg by mouth daily                                sulfaSALAzine 500 MG tablet   Commonly known as:  AZULFIDINE   Take 500 mg by mouth 2 times daily 2 in am and 2 pm                                TRAZODONE HCL PO   Take 50 mg by mouth 1-2 tablets nightly                                ULTRAM PO   Take  mg by mouth every 6 hours as needed for pain

## 2017-04-19 NOTE — SUMMARY OF CARE
Interscalene block left shoulder completed in preop. Fentanyl and versed given, patient tolerated well.

## 2017-04-19 NOTE — DISCHARGE INSTRUCTIONS
"Information about Liposomal bupivacaine (Exparel)    What is Liposomal bupivacaine?    Exparel is a numbing medication that can help you manage your pain after surgery.  This medication is similar to \"novacaine,\" which is often used by the dentist.  Exparel is released slowly and can help control pain for up to 72 hours.    What is the purpose of Liposomal bupivacaine ?    To manage your pain after surgery    To help you sleep better, take deep breaths, walk more comfortable, and feel up to visiting with others    How is the procedure done?    Liposomal bupivacaine medication by an injection.    It is usually given while you are under sedation right before your surgery.  If this is the case, you will be awake, but you should not experience any pain during the procedure.    For some people, the injection may be given at the very end of you surgery.  It all depends on the type of surgery and your situation.    The procedure usually takes about 15 minutes.  An ultrasound machine will help the anesthesiologist insert it in the right place.    A needle is used to place the numbing medication under your skin.  It provides pain relief by numbing the tissue in the area where your surgeon will make the incision.    What can I expect?    You may experience numbness, tingling, or a feeling of heaviness around the area that was injected.    The anesthesia team will check on you every day, for 3 days while you are in the hospital.    Let your nurse or anesthesia team know if you experience:       Numbness or tingling occurs in areas other than around the tubing     Blurry vision    Ringing in your ears    A metallic taste in your mouth    If you go home before the end of the 3 days, and experience any of the above symptoms, IMMEDIATELY CALL YOUR ANESTHESIOLOGIST:      Call: Dial [phone].or call [phone]  Let the  know why you are calling and ask them to contact the anesthesiologist right away for you.    You " should not receive any other type of numbing medication within 4 days after receiving Liposomal bupivacaine unless your anesthesiologist approves.                Exp 9/2017        Discharge Instructions: Arthroscopy of the Shoulder    Diet    You have no restrictions in your diet.    During the evening following surgery, drink plenty of fluids and eat a light supper.   Nausea    The anesthesia you received may produce some nausea.     If nauseated, stay in bed, keep your head down, and try drinking fluids such as 7-up, tea, or soup.  Discomfort    The amount of discomfort you feel is very unpredictable.    If you have pain that cannot be controlled with the prescription you may have been given, you should notify your doctor.     Some residual swelling and discomfort may occur for one or two weeks.    Applying an ice pack for 10 minutes at a time may help relieve pain and reduce swelling.     When applying the ice, be sure your shoulder dressing does not get wet. You can place plastic over the shoulder prior to applying the ice pack.   Drainage    You may expect drainage from the incisions on your shoulder.    Change the outer dressing in 3 days.    You may change the Band-Aids if they get soiled or wet. If you have white steri-strips across the incisions, leave them in place. They will fall off on their own in 7-10 days.     Activity    Wear sling if instructed by your doctor.    No active sports, rotating of the shoulder or heavy lifting are advised for one week after surgery.    You may not drive the day of surgery because of the effects of anesthesia.     You can go back to work or school provided no problems such as significant increase or pain/swelling arise.     You may shower the day after surgery. Do not rub the incisions and pat dry.  Call your doctor if:    You have a large amount of bleeding drainage or severe pain.                                                                                                                                                                                                                                                                                                            REV.    Same-Day Surgery   Adult Discharge Orders & Instructions     For 24 hours after surgery:  1. Get plenty of rest.  A responsible adult must stay with you for at least 24 hours after you leave the hospital.   2. Pain medication can slow your reflexes. Do not drive or use heavy equipment.  If you have weakness or tingling, don't drive or use heavy equipment until this feeling goes away.  3. Mixing alcohol and pain medication can cause dizziness and slow your breathing. It can even be fatal. Do not drink alcohol while taking pain medication.  4. Avoid strenuous or risky activities.  Ask for help when climbing stairs.   5. You may feel lightheaded.  If so, sit for a few minutes before standing.  Have someone help you get up.   6. If you have nausea (feel sick to your stomach), drink only clear liquids such as apple juice, ginger ale, broth or 7-Up.  Rest may also help.  Be sure to drink enough fluids.  Move to a regular diet as you feel able. Take pain medications with a small amount of solid food, such as toast or crackers, to avoid nausea.   7. A slight fever is normal. Call the doctor if your fever is over 100 F (37.7 C) (taken under the tongue) or lasts longer than 24 hours.  8. You may have a dry mouth, muscle aches, trouble sleeping or a sore throat.  These symptoms should go away after 24 hours.  9. Do not make important or legal decisions.   Pain Management:      1. Take pain medication (if prescribed) for pain as directed by your physician.        2. WARNING: If the pain medication you have been prescribed contains Tylenol  (acetaminophen), DO NOT take additional doses of Tylenol (acetaminophen).     Call your doctor for any of the followin.  Signs of infection (fever, growing tenderness at  the surgery site, severe pain, a large amount of drainage or bleeding, foul-smelling drainage, redness, swelling).    2.  It has been over 8 to 10 hours since surgery and you are still not able to urinate (pee).    3.  Headache for over 24 hours.    4.  Numbness, tingling or weakness the day after surgery (if you had spinal anesthesia).  To contact a doctor, call _____________________________________ or:      [phone] and ask for the Resident On Call for:          __________________________________________ (answered 24 hours a day)      Emergency Department:  [facility]: [phone]  [facility]: [phone]               Rev. 10/2014

## 2017-04-19 NOTE — ANESTHESIA CARE TRANSFER NOTE
Patient: Monika Frazier    Procedure(s):  Left Rotator Cuff Repair Arthroscopic, Subacromial Decompression, Biceps Tenotomy  - Wound Class: I-Clean    Diagnosis: Rotator Cuff Tear Left   Diagnosis Additional Information: No value filed.    Anesthesia Type:   General, ETT, Periph. Nerve Block for postop pain     Note:  Airway :Face Mask  Patient transferred to:PACU        Vitals: (Last set prior to Anesthesia Care Transfer)    CRNA VITALS  4/19/2017 1012 - 4/19/2017 1042      4/19/2017             SpO2: 96 %                Electronically Signed By: FELICE Rojas CRNA  April 19, 2017  10:42 AM

## 2017-04-19 NOTE — ANESTHESIA POSTPROCEDURE EVALUATION
Patient: Monika Frazier    Procedure(s):  Left Rotator Cuff Repair Arthroscopic, Subacromial Decompression, Biceps Tenotomy  - Wound Class: I-Clean    Diagnosis:Rotator Cuff Tear Left   Diagnosis Additional Information: No value filed.    Anesthesia Type:  General, ETT, Periph. Nerve Block for postop pain    Note:  Anesthesia Post Evaluation    Patient location during evaluation: PACU  Patient participation: Able to participate in evaluation but full recovery from regional anesthesia has not yet ocurrred but is anticipated to occur within 48 hours  Level of consciousness: awake and alert  Pain management: adequate  Airway patency: patent  Cardiovascular status: acceptable  Respiratory status: acceptable  Hydration status: acceptable  PONV: none     Anesthetic complications: None    Comments: EKG and electrolytes normal. Frequent PVCs improved. No complaints of symptoms of LAST or cardiac ischemia.        Last vitals:  Vitals:    04/19/17 1145 04/19/17 1200 04/19/17 1215   BP: 120/74 107/82 125/74   Pulse:      Resp: 16 19 16   Temp:      SpO2: 92% 92% 94%         Electronically Signed By: Israel Jack MD  April 19, 2017  12:30 PM

## 2017-04-19 NOTE — OP NOTE
DATE OF SURGERY:  04/19/2017      PREOPERATIVE DIAGNOSES:   1.  Left shoulder rotator cuff tear.   2.  Left shoulder biceps disease.   3.  Left shoulder subacromial bursitis.   4.  Morbid obesity.      POSTOPERATIVE DIAGNOSES:   1.  Left shoulder entirety of the supraspinatus, upper portion of the infraspinatus, superior portion of the subscapularis, full thickness retracted rotator cuff tear.   2.  Left shoulder subacromial bursitis with bony impingement.   3.  Left shoulder complex labral tearing with biceps tendon involvement and medialization.   4.  Morbid obesity.      SURGICAL PROCEDURES:   1.  Left shoulder arthroscopic rotator cuff repair.   2.  Left shoulder arthroscopic subacromial decompression with bony acromioplasty.   3.  Left shoulder arthroscopic glenohumeral debridement -- extensive including biceps tenotomy.      SURGEON:  Emerson Rucker MD      ASSISTANT:  Jil Dominguez PA-C.  The assistance of Jil Dominguez was necessitated by the orthopedic complexity of the case, the need to position the arm in space and pass and retrieve sutures.  Additionally, there was no other suitably qualified orthopedic surgery .  Lastly, no other level of surgical assistant would have provided adequate support for the patient's surgical well-being.      IMPLANTS:   1.  Arthrex 5.5 mm BioComposite corkscrew anchor x1 anteromedially.   2.  Arthrex 4.75 mm double-loaded BioComposite SwiveLock anchor x1 posteromedial, x2 laterally for bridging construct.      INDICATIONS:  Ms. Monika Frazier is a very pleasant 56-year-old woman with history of a rheumatologic disorder and the above-mentioned diagnoses.  She had pain and disability in her shoulder.  After trial of nonsurgical management including activity modification and analgesics, she elected to proceed with surgical remediation.      DESCRIPTION OF PROCEDURE:  The patient was positively identified in the preanesthesia care area, her surgical site was  "initialed by me and her consent was reviewed with her.  She was then taken to the operating theater, she was placed in a well-padded beachchair position, prepped and draped in the usual sterile fashion for left upper extremity surgery.      Prior to surgical initiation, a \"timeout\" was held in accordance with hospital policy.  Verbal verification of delivery of prophylactic intravenous antibiotics was performed.      After establishment of a posterior viewing portal, an antral portal was made under direct visualization.  Diagnostic arthroscopy was then possible with findings as follows:  The upper portion of the subscapularis was torn and medialized.  The pouch was synovitic, but devoid of loose bodies.  The posterior cuff was intact.  Superior cuff was torn.  Articular surface of the humerus had grade III changes posterior inferiorly, but no evidence of full thickness cartilage loss anterosuperiorly.  The articular surface of the glenoid had no evidence of full thickness cartilage loss.  There was circumferential labral fraying.      The biceps tendon was tenotomized and debrided back to a stable rim.  I debrided the anterior, superior and posterior sally.  I debrided the contents of the rotator interval.      I then turned my attention to the subacromial space.      Upon entering the subacromial space, significant bursitis was encountered.  I debrided this with a shaver and an ablator and denuded the undersurface of the acromion of all soft tissues using an ablator.  I used a motorized bur to resect the anterolateral based wedge of bone from the undersurface of the acromion that tapered posteromedially until type 1 acromial morphology was created.  I verified from orthogonal views that this was indeed the case.      I then used a modified Krackow stitch and a Tiger loop in the upper rolled border of the subscapularis to mobilize this and prepared the footprint of the subscapularis, supraspinatus and infraspinatus. "  I placed a 5.5-mm anterior medial anchor BioComposite corkscrew and passed all 4 strands in a mattress fashion.  I repeated this with the posteromedial 4.75 mm double loaded BioComposite SwiveLock anchor.  I tied these sequentially affecting an excellent reapproximation of the medial aspect of the rotator cuff back to its prepared bed.  I then bridged 2 strands from the anterior anchor and 2 strands from the posterior anchor with 2 strands from the upper rolled border subscapularis tear into an anterolateral SwiveLock.  I then bridged 2 strands from the remaining 2 medial row anchors into a posterolateral SwiveLock.  This effected an excellent bridging construct.  There was no gapping at the arm at the side or motion with vigorous internal and external rotation.      All instruments were removed.  Closure was with 3-0 interrupted Monocryl sutures.  Steri-Strips and sterile nonadherent dressing were applied.  A sling was placed.      POSTOPERATIVE PLAN:   1.  The patient will be discharged home today on oral analgesics.  She should have no active or passive range of motion at this time except for supine passive external rotation at the side 0-20 degrees.   2.  At the 2-week leonora, she will begin supine passive external rotation at the side 0-40 degrees.  She will taught this in clinic if possible.   3.  At 6 weeks, she will begin gentle progressive 4-quadrant stretching (no internal rotation up the back).   4.  At 3 months, she will have unrestrictive 4-quadrant stretching, periscapular stabilization and strengthening.  Radiographs will also be obtained at that visit.         TREMAINE ROJAS MD             D: 2017 10:37   T: 2017 11:03   MT: TA      Name:     CAMILLE BUTTS   MRN:      [id number]        Account:        [id number]   :      1960           Procedure Date: 2017      Document: [id number]

## 2017-04-19 NOTE — OR NURSING
Pt doing well. Pt denies pain or nausea at this time. Pt did have frequent PVC's on arrival from OR. Within the first 30 minutes of recovery pt was in bigeminey up to quadgemeny and back again. There was a sustained run of bigeminey. Pt denied any chest pain at this time. MDA notified and new orders obtained for labs and EKG. Will continue to monitor pt closely.

## 2017-04-20 LAB — INTERPRETATION ECG - MUSE: NORMAL

## 2017-06-24 ENCOUNTER — HEALTH MAINTENANCE LETTER (OUTPATIENT)
Age: 57
End: 2017-06-24

## 2019-12-13 RX ORDER — ACETAMINOPHEN 500 MG
500 TABLET ORAL EVERY 4 HOURS PRN
COMMUNITY

## 2019-12-13 RX ORDER — FAMOTIDINE 20 MG
1 TABLET ORAL DAILY
COMMUNITY

## 2019-12-13 RX ORDER — FERROUS SULFATE 325(65) MG
325 TABLET ORAL EVERY OTHER DAY
COMMUNITY

## 2019-12-13 RX ORDER — LANOLIN ALCOHOL/MO/W.PET/CERES
1 CREAM (GRAM) TOPICAL
COMMUNITY

## 2019-12-13 RX ORDER — FLUOXETINE 10 MG/1
10 CAPSULE ORAL DAILY
COMMUNITY

## 2019-12-13 RX ORDER — MAGNESIUM OXIDE 400 MG/1
400 TABLET ORAL
COMMUNITY

## 2019-12-16 ENCOUNTER — ANESTHESIA (OUTPATIENT)
Dept: SURGERY | Facility: CLINIC | Age: 59
End: 2019-12-16
Payer: COMMERCIAL

## 2019-12-16 ENCOUNTER — HOSPITAL ENCOUNTER (OUTPATIENT)
Facility: CLINIC | Age: 59
Discharge: HOME | End: 2019-12-16
Attending: ORTHOPAEDIC SURGERY | Admitting: ORTHOPAEDIC SURGERY
Payer: COMMERCIAL

## 2019-12-16 ENCOUNTER — ANESTHESIA EVENT (OUTPATIENT)
Dept: SURGERY | Facility: CLINIC | Age: 59
End: 2019-12-16
Payer: COMMERCIAL

## 2019-12-16 ENCOUNTER — APPOINTMENT (OUTPATIENT)
Dept: GENERAL RADIOLOGY | Facility: CLINIC | Age: 59
End: 2019-12-16
Attending: ORTHOPAEDIC SURGERY
Payer: COMMERCIAL

## 2019-12-16 VITALS
WEIGHT: 283 LBS | TEMPERATURE: 98 F | RESPIRATION RATE: 16 BRPM | HEART RATE: 66 BPM | HEIGHT: 65 IN | BODY MASS INDEX: 47.15 KG/M2 | SYSTOLIC BLOOD PRESSURE: 118 MMHG | DIASTOLIC BLOOD PRESSURE: 67 MMHG | OXYGEN SATURATION: 97 %

## 2019-12-16 DIAGNOSIS — Z98.1 S/P ANKLE FUSION: Primary | ICD-10-CM

## 2019-12-16 LAB — GLUCOSE BLDC GLUCOMTR-MCNC: 229 MG/DL (ref 70–99)

## 2019-12-16 PROCEDURE — 25000128 H RX IP 250 OP 636: Performed by: NURSE ANESTHETIST, CERTIFIED REGISTERED

## 2019-12-16 PROCEDURE — 71000027 ZZH RECOVERY PHASE 2 EACH 15 MINS: Performed by: ORTHOPAEDIC SURGERY

## 2019-12-16 PROCEDURE — 25000128 H RX IP 250 OP 636: Performed by: PHYSICIAN ASSISTANT

## 2019-12-16 PROCEDURE — 40000170 ZZH STATISTIC PRE-PROCEDURE ASSESSMENT II: Performed by: ORTHOPAEDIC SURGERY

## 2019-12-16 PROCEDURE — 40000277 XR SURGERY CARM FLUORO LESS THAN 5 MIN W STILLS

## 2019-12-16 PROCEDURE — C1713 ANCHOR/SCREW BN/BN,TIS/BN: HCPCS | Performed by: ORTHOPAEDIC SURGERY

## 2019-12-16 PROCEDURE — 25000128 H RX IP 250 OP 636: Performed by: ANESTHESIOLOGY

## 2019-12-16 PROCEDURE — 25000566 ZZH SEVOFLURANE, EA 15 MIN: Performed by: ORTHOPAEDIC SURGERY

## 2019-12-16 PROCEDURE — 27110028 ZZH OR GENERAL SUPPLY NON-STERILE: Performed by: ORTHOPAEDIC SURGERY

## 2019-12-16 PROCEDURE — 25000125 ZZHC RX 250: Performed by: ORTHOPAEDIC SURGERY

## 2019-12-16 PROCEDURE — 37000008 ZZH ANESTHESIA TECHNICAL FEE, 1ST 30 MIN: Performed by: ORTHOPAEDIC SURGERY

## 2019-12-16 PROCEDURE — 25800030 ZZH RX IP 258 OP 636: Performed by: ORTHOPAEDIC SURGERY

## 2019-12-16 PROCEDURE — 25800030 ZZH RX IP 258 OP 636: Performed by: NURSE ANESTHETIST, CERTIFIED REGISTERED

## 2019-12-16 PROCEDURE — 36000058 ZZH SURGERY LEVEL 3 EA 15 ADDTL MIN: Performed by: ORTHOPAEDIC SURGERY

## 2019-12-16 PROCEDURE — 82962 GLUCOSE BLOOD TEST: CPT

## 2019-12-16 PROCEDURE — L8699 PROSTHETIC IMPLANT NOS: HCPCS | Performed by: ORTHOPAEDIC SURGERY

## 2019-12-16 PROCEDURE — 27210794 ZZH OR GENERAL SUPPLY STERILE: Performed by: ORTHOPAEDIC SURGERY

## 2019-12-16 PROCEDURE — 71000013 ZZH RECOVERY PHASE 1 LEVEL 1 EA ADDTL HR: Performed by: ORTHOPAEDIC SURGERY

## 2019-12-16 PROCEDURE — 36000060 ZZH SURGERY LEVEL 3 W FLUORO 1ST 30 MIN: Performed by: ORTHOPAEDIC SURGERY

## 2019-12-16 PROCEDURE — 25000125 ZZHC RX 250: Performed by: NURSE ANESTHETIST, CERTIFIED REGISTERED

## 2019-12-16 PROCEDURE — 71000012 ZZH RECOVERY PHASE 1 LEVEL 1 FIRST HR: Performed by: ORTHOPAEDIC SURGERY

## 2019-12-16 PROCEDURE — 37000009 ZZH ANESTHESIA TECHNICAL FEE, EACH ADDTL 15 MIN: Performed by: ORTHOPAEDIC SURGERY

## 2019-12-16 DEVICE — IMPLANTABLE DEVICE: Type: IMPLANTABLE DEVICE | Site: ANKLE | Status: FUNCTIONAL

## 2019-12-16 DEVICE — IMP SCR ARTHREX BONE LOW PROFILE 55X5.5MM TI AR-8555-55: Type: IMPLANTABLE DEVICE | Site: ANKLE | Status: FUNCTIONAL

## 2019-12-16 DEVICE — IMP SCR ARTHREX BONE LOW PROFILE 30X4.5MM TI AR-8545-30: Type: IMPLANTABLE DEVICE | Site: ANKLE | Status: FUNCTIONAL

## 2019-12-16 DEVICE — IMP SCR ARTHREX BONE LOCKING LP 32X4.5MM TI AR-8545L-32: Type: IMPLANTABLE DEVICE | Site: ANKLE | Status: FUNCTIONAL

## 2019-12-16 DEVICE — IMP SCR ARTHREX BONE LOW PROFILE 60X5.5MM TI AR-8555-60: Type: IMPLANTABLE DEVICE | Site: ANKLE | Status: FUNCTIONAL

## 2019-12-16 DEVICE — IMP SCR ARTHREX BONE LOCKING LP 34X4.5MM TI AR-8545L-34: Type: IMPLANTABLE DEVICE | Site: ANKLE | Status: FUNCTIONAL

## 2019-12-16 DEVICE — IMP SCR ARTHREX BONE LOCKING LP 20X4.5MM TI AR-8545L-20: Type: IMPLANTABLE DEVICE | Site: ANKLE | Status: FUNCTIONAL

## 2019-12-16 DEVICE — GRAFT BONE INFUSE BMP MED 7510400: Type: IMPLANTABLE DEVICE | Site: ANKLE | Status: FUNCTIONAL

## 2019-12-16 DEVICE — IMP SCR ARTHREX BONE LOCKING LP 26X4.5MM TI AR-8545L-26: Type: IMPLANTABLE DEVICE | Site: ANKLE | Status: FUNCTIONAL

## 2019-12-16 DEVICE — IMP PLATE ARTHREX ANKLE FUSION ANTERIOR TT RT AR-8970AR: Type: IMPLANTABLE DEVICE | Site: ANKLE | Status: FUNCTIONAL

## 2019-12-16 DEVICE — IMP SCR ARTHREX BONE LOCKING LP 40X4.5MM TI AR-8545L-40: Type: IMPLANTABLE DEVICE | Site: ANKLE | Status: FUNCTIONAL

## 2019-12-16 RX ORDER — PROPOFOL 10 MG/ML
INJECTION, EMULSION INTRAVENOUS PRN
Status: DISCONTINUED | OUTPATIENT
Start: 2019-12-16 | End: 2019-12-16

## 2019-12-16 RX ORDER — NALOXONE HYDROCHLORIDE 0.4 MG/ML
.1-.4 INJECTION, SOLUTION INTRAMUSCULAR; INTRAVENOUS; SUBCUTANEOUS
Status: DISCONTINUED | OUTPATIENT
Start: 2019-12-16 | End: 2019-12-16 | Stop reason: HOSPADM

## 2019-12-16 RX ORDER — FENTANYL CITRATE 50 UG/ML
100 INJECTION, SOLUTION INTRAMUSCULAR; INTRAVENOUS ONCE
Status: DISCONTINUED | OUTPATIENT
Start: 2019-12-16 | End: 2019-12-16 | Stop reason: HOSPADM

## 2019-12-16 RX ORDER — OXYCODONE AND ACETAMINOPHEN 5; 325 MG/1; MG/1
1 TABLET ORAL
Status: DISCONTINUED | OUTPATIENT
Start: 2019-12-16 | End: 2019-12-16 | Stop reason: HOSPADM

## 2019-12-16 RX ORDER — LABETALOL HYDROCHLORIDE 5 MG/ML
10 INJECTION, SOLUTION INTRAVENOUS
Status: DISCONTINUED | OUTPATIENT
Start: 2019-12-16 | End: 2019-12-16 | Stop reason: HOSPADM

## 2019-12-16 RX ORDER — CEFAZOLIN SODIUM IN 0.9 % NACL 3 G/100 ML
3 INTRAVENOUS SOLUTION, PIGGYBACK (ML) INTRAVENOUS
Status: COMPLETED | OUTPATIENT
Start: 2019-12-16 | End: 2019-12-16

## 2019-12-16 RX ORDER — ONDANSETRON 4 MG/1
4 TABLET, ORALLY DISINTEGRATING ORAL
Status: DISCONTINUED | OUTPATIENT
Start: 2019-12-16 | End: 2019-12-16 | Stop reason: HOSPADM

## 2019-12-16 RX ORDER — HYDRALAZINE HYDROCHLORIDE 20 MG/ML
2.5-5 INJECTION INTRAMUSCULAR; INTRAVENOUS EVERY 10 MIN PRN
Status: DISCONTINUED | OUTPATIENT
Start: 2019-12-16 | End: 2019-12-16 | Stop reason: HOSPADM

## 2019-12-16 RX ORDER — FENTANYL CITRATE 50 UG/ML
INJECTION, SOLUTION INTRAMUSCULAR; INTRAVENOUS PRN
Status: DISCONTINUED | OUTPATIENT
Start: 2019-12-16 | End: 2019-12-16

## 2019-12-16 RX ORDER — MAGNESIUM HYDROXIDE 1200 MG/15ML
LIQUID ORAL PRN
Status: DISCONTINUED | OUTPATIENT
Start: 2019-12-16 | End: 2019-12-16 | Stop reason: HOSPADM

## 2019-12-16 RX ORDER — CEFAZOLIN SODIUM 1 G/3ML
1 INJECTION, POWDER, FOR SOLUTION INTRAMUSCULAR; INTRAVENOUS SEE ADMIN INSTRUCTIONS
Status: DISCONTINUED | OUTPATIENT
Start: 2019-12-16 | End: 2019-12-16 | Stop reason: HOSPADM

## 2019-12-16 RX ORDER — SODIUM CHLORIDE, SODIUM LACTATE, POTASSIUM CHLORIDE, CALCIUM CHLORIDE 600; 310; 30; 20 MG/100ML; MG/100ML; MG/100ML; MG/100ML
INJECTION, SOLUTION INTRAVENOUS CONTINUOUS
Status: DISCONTINUED | OUTPATIENT
Start: 2019-12-16 | End: 2019-12-16 | Stop reason: HOSPADM

## 2019-12-16 RX ORDER — FENTANYL CITRATE 50 UG/ML
25-50 INJECTION, SOLUTION INTRAMUSCULAR; INTRAVENOUS
Status: DISCONTINUED | OUTPATIENT
Start: 2019-12-16 | End: 2019-12-16 | Stop reason: HOSPADM

## 2019-12-16 RX ORDER — ONDANSETRON 4 MG/1
4 TABLET, ORALLY DISINTEGRATING ORAL EVERY 30 MIN PRN
Status: DISCONTINUED | OUTPATIENT
Start: 2019-12-16 | End: 2019-12-16 | Stop reason: HOSPADM

## 2019-12-16 RX ORDER — DEXAMETHASONE SODIUM PHOSPHATE 4 MG/ML
4 INJECTION, SOLUTION INTRA-ARTICULAR; INTRALESIONAL; INTRAMUSCULAR; INTRAVENOUS; SOFT TISSUE EVERY 10 MIN PRN
Status: DISCONTINUED | OUTPATIENT
Start: 2019-12-16 | End: 2019-12-16 | Stop reason: HOSPADM

## 2019-12-16 RX ORDER — AMOXICILLIN 250 MG
1-2 CAPSULE ORAL 2 TIMES DAILY
Qty: 30 TABLET | Refills: 0 | Status: SHIPPED | OUTPATIENT
Start: 2019-12-16

## 2019-12-16 RX ORDER — LIDOCAINE HYDROCHLORIDE 20 MG/ML
INJECTION, SOLUTION INFILTRATION; PERINEURAL PRN
Status: DISCONTINUED | OUTPATIENT
Start: 2019-12-16 | End: 2019-12-16

## 2019-12-16 RX ORDER — HYDROMORPHONE HYDROCHLORIDE 1 MG/ML
.3-.5 INJECTION, SOLUTION INTRAMUSCULAR; INTRAVENOUS; SUBCUTANEOUS EVERY 10 MIN PRN
Status: DISCONTINUED | OUTPATIENT
Start: 2019-12-16 | End: 2019-12-16 | Stop reason: HOSPADM

## 2019-12-16 RX ORDER — ONDANSETRON 4 MG/1
4-8 TABLET, ORALLY DISINTEGRATING ORAL EVERY 6 HOURS PRN
Qty: 15 TABLET | Refills: 0 | Status: SHIPPED | OUTPATIENT
Start: 2019-12-16

## 2019-12-16 RX ORDER — ONDANSETRON 2 MG/ML
4 INJECTION INTRAMUSCULAR; INTRAVENOUS EVERY 30 MIN PRN
Status: DISCONTINUED | OUTPATIENT
Start: 2019-12-16 | End: 2019-12-16 | Stop reason: HOSPADM

## 2019-12-16 RX ORDER — MEPERIDINE HYDROCHLORIDE 25 MG/ML
12.5 INJECTION INTRAMUSCULAR; INTRAVENOUS; SUBCUTANEOUS
Status: DISCONTINUED | OUTPATIENT
Start: 2019-12-16 | End: 2019-12-16 | Stop reason: HOSPADM

## 2019-12-16 RX ORDER — SODIUM CHLORIDE, SODIUM LACTATE, POTASSIUM CHLORIDE, CALCIUM CHLORIDE 600; 310; 30; 20 MG/100ML; MG/100ML; MG/100ML; MG/100ML
INJECTION, SOLUTION INTRAVENOUS CONTINUOUS PRN
Status: DISCONTINUED | OUTPATIENT
Start: 2019-12-16 | End: 2019-12-16

## 2019-12-16 RX ORDER — PROPOFOL 10 MG/ML
INJECTION, EMULSION INTRAVENOUS CONTINUOUS PRN
Status: DISCONTINUED | OUTPATIENT
Start: 2019-12-16 | End: 2019-12-16

## 2019-12-16 RX ORDER — OXYCODONE AND ACETAMINOPHEN 5; 325 MG/1; MG/1
1-2 TABLET ORAL EVERY 4 HOURS PRN
Qty: 40 TABLET | Refills: 0 | Status: SHIPPED | OUTPATIENT
Start: 2019-12-16

## 2019-12-16 RX ADMIN — FENTANYL CITRATE 25 MCG: 50 INJECTION, SOLUTION INTRAMUSCULAR; INTRAVENOUS at 07:33

## 2019-12-16 RX ADMIN — Medication 3 G: at 07:28

## 2019-12-16 RX ADMIN — DEXMEDETOMIDINE HYDROCHLORIDE 8 MCG: 100 INJECTION, SOLUTION INTRAVENOUS at 08:05

## 2019-12-16 RX ADMIN — DEXMEDETOMIDINE HYDROCHLORIDE 12 MCG: 100 INJECTION, SOLUTION INTRAVENOUS at 08:12

## 2019-12-16 RX ADMIN — SODIUM CHLORIDE, POTASSIUM CHLORIDE, SODIUM LACTATE AND CALCIUM CHLORIDE: 600; 310; 30; 20 INJECTION, SOLUTION INTRAVENOUS at 06:23

## 2019-12-16 RX ADMIN — DEXAMETHASONE SODIUM PHOSPHATE 4 MG: 4 INJECTION, SOLUTION INTRA-ARTICULAR; INTRALESIONAL; INTRAMUSCULAR; INTRAVENOUS; SOFT TISSUE at 07:38

## 2019-12-16 RX ADMIN — FENTANYL CITRATE 50 MCG: 0.05 INJECTION, SOLUTION INTRAMUSCULAR; INTRAVENOUS at 06:40

## 2019-12-16 RX ADMIN — SODIUM CHLORIDE, POTASSIUM CHLORIDE, SODIUM LACTATE AND CALCIUM CHLORIDE: 600; 310; 30; 20 INJECTION, SOLUTION INTRAVENOUS at 07:21

## 2019-12-16 RX ADMIN — MIDAZOLAM 1 MG: 1 INJECTION INTRAMUSCULAR; INTRAVENOUS at 06:40

## 2019-12-16 RX ADMIN — MIDAZOLAM 2 MG: 1 INJECTION INTRAMUSCULAR; INTRAVENOUS at 07:18

## 2019-12-16 RX ADMIN — ONDANSETRON 4 MG: 2 INJECTION INTRAMUSCULAR; INTRAVENOUS at 08:13

## 2019-12-16 RX ADMIN — LIDOCAINE HYDROCHLORIDE 100 MG: 20 INJECTION, SOLUTION INFILTRATION; PERINEURAL at 07:24

## 2019-12-16 RX ADMIN — MIDAZOLAM 1 MG: 1 INJECTION INTRAMUSCULAR; INTRAVENOUS at 06:39

## 2019-12-16 RX ADMIN — LIDOCAINE HYDROCHLORIDE 0.1 ML: 10 INJECTION, SOLUTION EPIDURAL; INFILTRATION; INTRACAUDAL; PERINEURAL at 06:24

## 2019-12-16 RX ADMIN — PROPOFOL 200 MG: 10 INJECTION, EMULSION INTRAVENOUS at 07:24

## 2019-12-16 RX ADMIN — FENTANYL CITRATE 50 MCG: 0.05 INJECTION, SOLUTION INTRAMUSCULAR; INTRAVENOUS at 06:50

## 2019-12-16 RX ADMIN — PROPOFOL 10 MCG/KG/MIN: 10 INJECTION, EMULSION INTRAVENOUS at 07:26

## 2019-12-16 RX ADMIN — FENTANYL CITRATE 25 MCG: 50 INJECTION, SOLUTION INTRAMUSCULAR; INTRAVENOUS at 07:24

## 2019-12-16 ASSESSMENT — MIFFLIN-ST. JEOR: SCORE: 1849.56

## 2019-12-16 NOTE — DISCHARGE INSTRUCTIONS
Same Day Surgery Discharge Instructions for  Sedation and General Anesthesia       It's not unusual to feel dizzy, light-headed or faint for up to 24 hours after surgery or while taking pain medication.  If you have these symptoms: sit for a few minutes before standing and have someone assist you when you get up to walk or use the bathroom.      You should rest and relax for the next 24 hours. We recommend you make arrangements to have an adult stay with you for at least 24 hours after your discharge.  Avoid hazardous and strenuous activity.      DO NOT DRIVE any vehicle or operate mechanical equipment for 24 hours following the end of your surgery.  Even though you may feel normal, your reactions may be affected by the medication you have received.      Do not drink alcoholic beverages for 24 hours following surgery.       Slowly progress to your regular diet as you feel able. It's not unusual to feel nauseated and/or vomit after receiving anesthesia.  If you develop these symptoms, drink clear liquids (apple juice, ginger ale, broth, 7-up, etc. ) until you feel better.  If your nausea and vomiting persists for 24 hours, please notify your surgeon.        All narcotic pain medications, along with inactivity and anesthesia, can cause constipation. Drinking plenty of liquids and increasing fiber intake will help.      For any questions of a medical nature, call your surgeon.      Do not make important decisions for 24 hours.      If you had general anesthesia, you may have a sore throat for a couple of days related to the breathing tube used during surgery.  You may use Cepacol lozenges to help with this discomfort.  If it worsens or if you develop a fever, contact your surgeon.       If you feel your pain is not well managed with the pain medications prescribed by your surgeon, please contact your surgeon's office to let them know so they can address your concerns.           [facility]      DISCHARGE  "INSTRUCTIONS FOLLOWING   REGIONAL BLOCK ANESTHESIA      Numbness or lack of feeling in the arm/leg that was operated may last up to 24 hours.  The average time is usually 10-15 hours.  You may not be able to lift or move the arm or leg where the operation was by itself during that time.  Long-acting local anesthetic medicines were used to give you long-lasting pain relief.    Wear a sling until your arm is completely \"awake\"    Avoid bumping your arm, leg or foot while it is numb    Avoid extremes of hot or cold while it is numb    Remain quiet and restful the day of surgery.  Resume normal activities gradually over the next day or so as advise by your surgeon.    Do not drive or operate  Any machinery until your extremity is full  \"awake\"        You will have a tingling and prickly sensation in your arm/leg as the feeling begins to return; you can also expect some discomfort. The amount of discomfort is unpredictable, but if you have more pain that can be controlled with the pain medication you received, you should contact your surgeon.  Start to take your pain pills as soon as you start to feel any discomfort or pain.      POST-OPERATIVE INSTRUCTIONS  FOOT AND ANKLE SURGERY  JAIME Trivedi M.D.  Tyson An P.A.-C    These precautions MUST be followed for the first 24 hours after surgery:    Upon discharge, go directly home.    You must make arrangements to have a responsible adult stay with you.    DO NOT DRINK ALCOHOLIC BEVERAGES    It is not unusual to feel lightheaded up to 24 hours after surgery or while taking pain medication.  If you feel lightheaded, sit for a few minutes before standing and have someone assisted you when you get up to walk or use the restroom.    Do not use any mechanical equipment.    Do not make any important or legal decisions for 24 hours or while on pain medications.    You may experience dry mouth, sore throat, or sleep disturbances from the anesthesia and medications used " during surgery.  Generalized muscle aches can sometimes occur.  These usually disappear in 12-24 hours.    POST-OPERATIVE CARE GUIDELINES    The following are general guidelines about what to expect the first days/weeks after surgery.  They are not specific, and your recovery may be slightly different.    Blood clots are not common, but are emergencies.  If you have sudden onset pain in your calf or leg, or have sudden shortness of breath, go to the Emergency Department.      Elevation of your operative foot/ankle is key to reducing the swelling in the immediate post-operative phase (first 3-5 days).  When you are at rest, elevate your foot at or above the level of your heart.  When sitting, your foot should be elevated on a chair or stool; not hanging down.      You should plan to rest the day after surgery no matter how minor the procedure.  More complicated procedures will require more time to return to normal activity.      Foot and ankle surgery is painful in most cases.   It is not unusual for the pain to be worse a day or two after surgery than on the day of.  If your pain is more than 8/10 contact our office.  If you don t have pain, gradually decrease your pain meds by substituting Tylenol.  Please don t use Advil/ibuprofen unless we order it for you.  Pt may resume regular home medications of:  ALL PRESCRIBED BLOOD THINNERS (INCLUDING ASA 81MG) on the day after surgery.        You will be prescribed Percocet or Vicodin for pain, Vistaril for spasms/pain adjunct, Senokot for constipation.  If you have had trouble taking these meds before or experience nausea or vomiting after surgery from your medication, please advise the recovery room nurses.  If you are already at home, try drinking only clear liquids and/or call our office.  Start taking narcotic pain medication when you feel sensation in your lower extremity after a block.       All pain medications, along with inactivity can cause constipation.  Use  the Senakot as directed, increase fluid intake to 1 quart per day and increase your dietary fiber.  (The  P  fruits - peaches, plums, pears, and prunes as well as anise/black licorice are recommended.)    It is not unusual to run a low-grade fever after surgery.  If your temperature is elevated above 102 , lasts longer than 24 hours, or is questionable in any way, contact our office.      Drainage from your cast/dressing is normal.  Reinforce your cast/dressing with 4x4 dressings and cover with an Ace wrap.  Wait until 24 hours after surgery to change your dressings; by this time most of your bleeding will have stopped.  If you have drainage through your dressings 2 days after surgery, contact our office.      You cast/dressing will be changed at your 2-week follow up appointment.  They should be kept clean and DRY.  If your cast/dressing is damaged before that, contact our office.      It is normal to experience some bruising in the toes after surgery and they may appear  dark  when your foot hangs down.  It is important to actively wiggle your toes for at least 5-10 minutes each hour UNLESS you had surgery on those toes.      Use ice on your foot/ankle over the dressing/cast for 20 minutes per hour, 10 or more times per day.  A large bag of frozen peas works well.      Bathing: take a tub or sponge bath instead of a shower if possible during the first two weeks.  If you choose to shower, cover the dressing/cast with a waterproof covering, these may be ordered from www.seal-tight.com or are available at some pharmacies/medical supply stores.  Another option is XeroSox, which is the original vacuum sealed bandage and cast cover.  The cast cover has a vacuum seal and is absolutely waterproof.  [phone] or www.xerosox.com for more information.      Driving:  For surgery on the left foot/ankle, you may drive as soon as narcotic medications are stopped.  For surgery on the right foot/ankle, you may drive when you  are out of a cast, off pain medications, and you feel secure with braking.      In general, listen to your foot/ankle.  If you have a sudden, dramatic increase in pain that does not resolve after an hour or so, something is wrong - call our office.  If you fall or bump your foot/ankle and have sudden pain that resolves, give it 24 hours before you call.      Many of your questions can be addressed at your 2-week follow-up appointment - please make a list of things to ask us as they come up during your recovery.      If you had a nerve block it is common to have numbness in your leg and foot for up to 30 hours after surgery.  If your leg or foot is still numb more than 30 hours after surgery, please call the office.      FUTURE DENTIST VISITS:  If you have had a total ankle arthroplasty please be advised you must be on antibiotics prior to ANY dental work.  Please call your dentist office ahead of time and make them aware of this as your dentist will be able to order the prescription.  If you have had any other surgery this is not a concern.    If you have any further questions or concerns, please call our office at [phone]

## 2019-12-16 NOTE — BRIEF OP NOTE
[facility]    Brief Operative Note    Pre-operative diagnosis: * No pre-op diagnosis entered *  Post-operative diagnosis Same as pre-operative diagnosis    Procedure: Procedure(s):  RIGHT CORRECTIVE OSTEOTOMY AND REVISION FUSION  Surgeon: Surgeon(s) and Role:     * Betzy Trivedi MD - Primary     * Reed An PA-C - Assisting  Anesthesia: General   Estimated blood loss: Less than 10 ml  Drains: None  Specimens: * No specimens in log *  Findings:   Expected.  Complications: None.  Implants:   Implant Name Type Inv. Item Serial No.  Lot No. LRB No. Used   GRAFT BONE INFUSE BMP MED [id number]  GRAFT BONE INFUSE BMP MED [id number]  [employer] [id number] Right 1   PLATE AND 7 SCREWS      Right 1   IMP PLATE ARTHREX ANKLE FUSION ANTERIOR TT RT [id number] Metallic [employer]ware/[employer] IMP PLATE ARTHREX ANKLE FUSION ANTERIOR TT RT [id number]  ARTHREX 12/13/2019 41 07 Right 1

## 2019-12-16 NOTE — ANESTHESIA POSTPROCEDURE EVALUATION
Patient: Monika Frazier    Procedure(s):  RIGHT CORRECTIVE OSTEOTOMY AND REVISION FUSION    Diagnosis:* No pre-op diagnosis entered *  Diagnosis Additional Information: No value filed.    Anesthesia Type:  General, LMA, Peripheral Nerve Block for post-op pain at surgeon's request    Note:  Anesthesia Post Evaluation    Patient location during evaluation: PACU  Patient participation: Able to fully participate in evaluation  Level of consciousness: awake and alert  Pain management: adequate  Airway patency: patent  Cardiovascular status: acceptable  Respiratory status: acceptable and unassisted  Hydration status: acceptable  PONV: none             Last vitals:  Vitals:    12/16/19 0845 12/16/19 0900 12/16/19 0915   BP: 118/72 115/70 126/67   Pulse: 66 60 66   Resp: 14 12 12   Temp:   36.7  C (98  F)   SpO2: 96% 94% 91%         Electronically Signed By: Kev Hallman MD  December 16, 2019  9:56 AM

## 2019-12-16 NOTE — OP NOTE
Procedure Date: 12/16/2019      PREOPERATIVE DIAGNOSIS:  Nonunion and malunion of a previous right ankle fusion done elsewhere.      POSTOPERATIVE DIAGNOSIS:  Nonunion and malunion of a previous right ankle fusion done elsewhere.      PROCEDURES:   1.  Revision of right ankle fusion with corrective osteotomy and revision fixation, as well as placement of Infuse.   2.  Removal of plates and screws from the right ankle.      ANESTHESIA:  General.      SURGEON:  Betzy Trivedi MD      ASSISTANT:  ROSAS Gil      PREAMBLE:  Ms. Frazier presented with a malunion and nonunion of right ankle fusion.  She has an equinus and hindfoot varus.  She is very painful.      Informed consent was obtained for the above-mentioned procedure.      An assistant was present and was needed for the entire procedure to help with retraction of the wounds and protection of the neurovascular structures.      DESCRIPTION OF PROCEDURE:  After adequate induction of a general anesthetic, the patient was positioned supine on the operating table.  The right leg was sterilely prepped and free-draped in the usual fashion.  Tourniquet around the thigh was inflated to 300 mmHg.      The previous longitudinal incision was made anterior over the ankle.  The interval between extensor hallucis longus and tibialis anterior was used to expose the plate and screws.  The plate and screws were removed.  All the screws were loose.  There was a gross nonunion of the ankle fusion.      A separate medial incision was made to retrieve the medial screw which was deeply embedded into the bone.  A large tunnel was required to retrieve the screw.        A saw was then used to do revision cuts of the tibia to bring it out of equinus and also out of varus.      Once the corrected position was obtained, the joint was immobilized with an Arthrex anterior fusion plate with excellent stability and alignment.      Due to the previous nonunion, medium Infuse was used  to pack around the fusion site.  The construct was completely stable.      The tourniquet was deflated.  Hemostasis obtained.  The wounds were closed in layers.  A sterile dressing and a light compressive bandage applied followed by a short leg cast.  She tolerated the procedure well and was taken to the recovery room in satisfactory condition.      She can be toe-touch weightbearing with crutches.  Sutures will be removed in 2 weeks.         HAKEEM DO MD             D: 2019   T: 2019   MT: KAY      Name:     CAMILLE BUTTS   MRN:      [id number]        Account:        [id number]   :      1960           Procedure Date: 2019      Document: [id number]

## 2019-12-16 NOTE — ANESTHESIA CARE TRANSFER NOTE
Patient: Monika Frazier    Procedure(s):  RIGHT CORRECTIVE OSTEOTOMY AND REVISION FUSION    Diagnosis: * No pre-op diagnosis entered *  Diagnosis Additional Information: No value filed.    Anesthesia Type:   General, LMA     Note:  Airway :Nasal Cannula  Patient transferred to:PACU  Comments: At end of procedure, spontaneous respirations, adequate tidal volumes, followed commands to voice, LMA removed atraumatically, airway patent after LMA removal. Oxygen via nasal cannula at 3 liters per minute to PACU. Oxygen tubing connected to wall O2 in PACU, SpO2, NiBP, and EKG monitors and alarms on and functioning, Feliz Hugger warmer connected to patient gown, report on patient's clinical status given to PACU RN, RN questions answered.Handoff Report: Identifed the Patient, Identified the Reponsible Provider, Reviewed the pertinent medical history, Discussed the surgical course, Reviewed Intra-OP anesthesia mangement and issues during anesthesia, Set expectations for post-procedure period and Allowed opportunity for questions and acknowledgement of understanding      Vitals: (Last set prior to Anesthesia Care Transfer)    CRNA VITALS  12/16/2019 0753 - 12/16/2019 0823      12/16/2019             Resp Rate (set):  10                Electronically Signed By: FELICE Scott CRNA  December 16, 2019  8:23 AM

## 2019-12-16 NOTE — OR NURSING
Up to bathroom with walker and assitance. Post op shoe on and toe touch only on right foot. Patient voided without difficulty.

## 2019-12-16 NOTE — ANESTHESIA PROCEDURE NOTES
Peripheral nerve/Neuraxial procedure note : Adductor canal and Femoral  Pre-Procedure  Performed by Kev Hallman MD  Location: pre-op      Pre-Anesthestic Checklist: patient identified, IV checked, site marked, risks and benefits discussed, informed consent, monitors and equipment checked, pre-op evaluation, at physician/surgeon's request and post-op pain management    Timeout  Correct Patient: Yes   Correct Procedure: Yes   Correct Site: Yes   Correct Laterality: Yes   Correct Position: Yes   Site Marked: Yes   .   Procedure Documentation    .    Procedure: Adductor canal and Femoral, right.   Patient Position:supine Local skin infiltrated with 3 mL of 1% lidocaine.    Ultrasound used to identify targeted nerve, plexus, or vascular marker and placed a needle adjacent to it., Ultrasound was used to visualize the spread of the anesthetic in close proximity to the above stated nerve. A permanent image is entered into the patient's record.  Patient Prep/Sterile Barriers; mask, sterile gloves, chlorhexidine gluconate and isopropyl alcohol.  .  Needle: insulated, short bevel ([place])   Needle Gauge: 21.  Needle Length (millimeters) 100  Insertion Method: Single Shot.        Assessment/Narrative  Paresthesias: No.  Injection made incrementally with aspirations every 5 mL..  The placement was negative for: blood aspirated, painful injection and site bleeding.  Bolus given via needle..   Secured via.   Complications: none. Test dose of mL at. Test dose negative for signs of intravascular, subdural or intrathecal injection. Comments:  0.5% Bupivacaine with 1:400,000 epinephrine injected. Patient tolerated the procedure well.    Ultrasound Interpretation, Peripheral Nerve Block    1. Under ultrasound guidance, the needle was inserted and placed in close proximity to the target nerve(s).  2. Ultrasound was also used to visualize the spread of the anesthetic in close proximity to the nerve(s) being blocked.  Local  anesthetic was administered in incremental doses, with intermittent negative aspiration.    3. The nerve(s) appeared anatomically normal.  4. There were no apparent abnormal pathological findings.  5. A permanent ultrasound image was saved in the patient's record.    The surgeon has given a verbal order transferring care of this patient to me for the performance of a regional analgesia block for post-op pain control. It is requested of me because I am uniquely trained and qualified to perform this block and the surgeon is neither trained nor qualified to perform this procedure.

## 2019-12-16 NOTE — ANESTHESIA PREPROCEDURE EVALUATION
Anesthesia Pre-Procedure Evaluation    Patient: Monika Frazier   MRN: [id number] : 1960          Preoperative Diagnosis: * No pre-op diagnosis entered *    Procedure(s):  RIGHT CORRECTIVE OSTEOTOMY AND REVISION FUSION    Past Medical History:   Diagnosis Date     ADD (attention deficit disorder)      Adenomatous colon polyp      Anemia     Iron deficiency     Anticardiolipin antibody positive      Anxiety      Arthritis     Rheumatoid     Chronic allergic conjunctivitis      Chronic gastritis      Closed fracture of right ankle     trimalleolar fracture     Complication of anesthesia     panic attack post op     Depression      Diabetes (H)     Type 2     GERD (gastroesophageal reflux disease)      High risk medication use      History of shingles      Hyperlipidemia      Hypertension      Intracranial meningioma (H)      LFTs abnormal      Liver disease     fatty liver disease     Localized osteoarthrosis of ankle and foot      Low back pain      Lumbago      Morbid obesity (H)      Nonallopathic lesion     cervical region, sacral region, thoracic region     Osteoarthritis      Other chronic pain     L foot     Positive anti-CCP test      Seropositive rheumatoid arthritis (H)      Sleep apnea     uses CPAP     Stress incontinence      Tear of left rotator cuff      Past Surgical History:   Procedure Laterality Date     ANKLE SURGERY Right     trimalleolar fracture s/p ankle fusion, hardware removal     ARTHROSCOPY SHOULDER BICEPS TENODESIS REPAIR Right 2015    Procedure: ARTHROSCOPY SHOULDER BICEPS TENODESIS REPAIR;  Surgeon: Emerson Rucker MD;  [facility]     ARTHROSCOPY SHOULDER ROTATOR CUFF REPAIR Right 2015    Procedure: ARTHROSCOPY SHOULDER ROTATOR CUFF REPAIR;  Surgeon: Emerson Rucker MD;  [facility]: [facility]     ARTHROSCOPY SHOULDER ROTATOR CUFF REPAIR Left 2017    Procedure: ARTHROSCOPY SHOULDER ROTATOR CUFF REPAIR;  Left Rotator Cuff Repair Arthroscopic,  Subacromial Decompression, Biceps Tenotomy ;  Surgeon: Emerson Rucker MD;  Location: [facility]     BACK SURGERY      Lumbar spine decompression     COLONOSCOPY       GYN SURGERY      Hysterectomy     ORTHOPEDIC SURGERY Left     Foot surgery     SURGICAL PATHOLOGY EXAM Left 07/2017    left anterior temporal meningioma, [facility]       Anesthesia Evaluation     . Pt has had prior anesthetic. Type: General    History of anesthetic complications    panic attack      ROS/MED HX    ENT/Pulmonary:     (+)sleep apnea, uses CPAP , . .    Neurologic: Comment: meningioma      Cardiovascular:     (+) Dyslipidemia, hypertension----. : . . . :. .       METS/Exercise Tolerance:     Hematologic: Comments: Anticardiolipin antibody        Musculoskeletal:         GI/Hepatic:     (+) GERD Asymptomatic on medication, liver disease,       Renal/Genitourinary:         Endo:     (+) type II DM Not using insulin Obesity, .      Psychiatric:  - neg psychiatric ROS   (+) psychiatric history anxiety and depression      Infectious Disease:         Malignancy:         Other:    (+) H/O Chronic Pain,                        Physical Exam  Normal systems: cardiovascular, pulmonary and dental    Airway   Mallampati: II  TM distance: >3 FB  Neck ROM: full    Dental     Cardiovascular       Pulmonary             Lab Results   Component Value Date    WBC 9.0 12/04/2007    HGB 12.3 04/19/2017    HCT 39.7 12/04/2007     12/04/2007     04/19/2017    POTASSIUM 4.1 04/19/2017    CHLORIDE 106 04/19/2017    CO2 24 04/19/2017    BUN 16 04/19/2017    CR 0.59 04/19/2017     (H) 04/19/2017    FABIOLA 8.4 (L) 04/19/2017    PHOS 3.4 04/19/2017    MAG 1.9 04/19/2017       Preop Vitals  BP Readings from Last 3 Encounters:   12/16/19 127/80   04/19/17 117/76   12/23/15 121/81    Pulse Readings from Last 3 Encounters:   04/19/17 70      Resp Readings from Last 3 Encounters:   12/16/19 20   04/19/17 18   12/23/15 14    SpO2 Readings from Last 3  "Encounters:   12/16/19 98%   04/19/17 96%   12/23/15 93%      Temp Readings from Last 1 Encounters:   12/16/19 36.1  C (97  F) (Temporal)    Ht Readings from Last 1 Encounters:   12/16/19 1.651 m (5' 5\")      Wt Readings from Last 1 Encounters:   12/16/19 128.4 kg (283 lb)    Estimated body mass index is 47.09 kg/m  as calculated from the following:    Height as of this encounter: 1.651 m (5' 5\").    Weight as of this encounter: 128.4 kg (283 lb).       Anesthesia Plan      History & Physical Review  History and physical reviewed and following examination; no interval change.precedex before wake up    ASA Status:  3 .    NPO Status:  > 8 hours    Plan for General and LMA with Intravenous and Propofol induction. Maintenance will be Balanced.    PONV prophylaxis:  Ondansetron (or other 5HT-3) and Dexamethasone or Solumedrol       Postoperative Care  Postoperative pain management:  Peripheral nerve block (Single Shot) and Multi-modal analgesia.      Consents  Anesthetic plan, risks, benefits and alternatives discussed with:  Patient..                 Kev Hallman MD  "

## 2019-12-16 NOTE — ANESTHESIA PROCEDURE NOTES
Peripheral nerve/Neuraxial procedure note : Popliteal  Pre-Procedure  Performed by Kev Hallman MD  Location: pre-op      Pre-Anesthestic Checklist: patient identified, IV checked, site marked, risks and benefits discussed, informed consent, monitors and equipment checked, pre-op evaluation, at physician/surgeon's request and post-op pain management    Timeout  Correct Patient: Yes   Correct Procedure: Yes   Correct Site: Yes   Correct Laterality: Yes   Correct Position: Yes   Site Marked: Yes   .   Procedure Documentation    .    Procedure: Popliteal, right.   Patient Position:supine Local skin infiltrated with 3 mL of 1% lidocaine.    Ultrasound used to identify targeted nerve, plexus, or vascular marker and placed a needle adjacent to it., Ultrasound was used to visualize the spread of the anesthetic in close proximity to the above stated nerve. A permanent image is entered into the patient's record.  Patient Prep/Sterile Barriers; mask, sterile gloves, chlorhexidine gluconate and isopropyl alcohol.  .  Needle: insulated, short bevel   Needle Gauge: 20.  Needle Length (millimeters) 100  Insertion Method: Single Shot.        Assessment/Narrative  Paresthesias: No.  Injection made incrementally with aspirations every 5 mL..  The placement was negative for: blood aspirated, painful injection and site bleeding.  Bolus given via needle..   Secured via.   Complications: none. Test dose of mL at. Test dose negative for signs of intravascular, subdural or intrathecal injection. Comments:  0.5% Bupivacaine with 1:400,000 epinephrine injected. Patient tolerated the procedure well.      Ultrasound Interpretation, Peripheral Nerve Block    1. Under ultrasound guidance, the needle was inserted and placed in close proximity to the target nerve(s).  2. Ultrasound was also used to visualize the spread of the anesthetic in close proximity to the nerve(s) being blocked.  Local anesthetic was administered in incremental  doses, with intermittent negative aspiration.    3. The nerve(s) appeared anatomically normal.  4. There were no apparent abnormal pathological findings.  5. A permanent ultrasound image was saved in the patient's record.    The surgeon has given a verbal order transferring care of this patient to me for the performance of a regional analgesia block for post-op pain control. It is requested of me because I am uniquely trained and qualified to perform this block and the surgeon is neither trained nor qualified to perform this procedure.

## 2024-10-20 NOTE — ANESTHESIA PROCEDURE NOTES
Peripheral Nerve Block Procedure Note    Staff:     Anesthesiologist:  SCOTT BLANCHARD  Location: Pre-op  Procedure Start/Stop TImes:      4/19/2017 8:18 AM     4/19/2017 8:26 AM    patient identified, IV checked, site marked, risks and benefits discussed, informed consent, monitors and equipment checked, pre-op evaluation, at physician/surgeon's request and post-op pain management      Correct Patient: Yes      Correct Position: Yes      Correct Site: Yes      Correct Procedure: Yes      Correct Laterality:  Yes    Site Marked:  Yes  Procedure details:     Procedure:  Interscalene    ASA:  3    Diagnosis:  Rotator cuff tear    Laterality:  Left    Position:  Supine    Sterile Prep: chloraprep, mask and sterile gloves      Local skin infiltration:  1% lidocaine    Needle:  Short bevel    Needle gauge:  21    Needle length (mm):  100    Ultrasound: Yes      Ultrasound used to identify targeted nerve, plexus, or vascular structure and placed a needle adjacent to it      Permanent Image entered into patiient's record      Abnormal pain on injection: No      Blood Aspirated: No      Paresthesias:  No    Bleeding at site: No      Bolus via:  Needle    Infusion Method:  Single Shot    Blood aspirated via catheter: No      Complications:  None  Assessment/Narrative:     Injection made incrementally with aspirations every (mL):  3     Discussed with Patient Off-Label use of Liposomal Bupivacaine (Exparel) for Nerve Block.    Relevant risks & benefits were discussed with patient.    All questions were answered and there was agreement to proceed.    Patient signed Off-Label Use of Exparel Consent Form.            
No

## (undated) DEVICE — GLOVE PROTEXIS W/NEU-THERA 8.0  2D73TE80

## (undated) DEVICE — CAST PADDING 4" UNSTERILE 9044

## (undated) DEVICE — SOL HYDROGEN PEROXIDE 3% 4OZ BOTTLE F0010

## (undated) DEVICE — BLADE KNIFE SURG 15 371115

## (undated) DEVICE — LIGHT HANDLE X1 31140133

## (undated) DEVICE — CAST PLASTER SPLINT 5X30" 7395

## (undated) DEVICE — BLADE SAW SAGITTAL STRK 18X90X1.27MM HD SYS 6 6118-127-090

## (undated) DEVICE — RESTRAINT LIMB HOLDER ANKLE/WRIST FOAM W/QUICK RELEASE 2533

## (undated) DEVICE — DRAPE MAYO STAND 23X54 8337

## (undated) DEVICE — BNDG ELASTIC 4" DBL LENGTH UNSTERILE 6611-14

## (undated) DEVICE — GOWN XXLG REINFORCED 9071EL

## (undated) DEVICE — IMP SCR ARTHREX BONE LOW PROFILE 34X4.5MM TI AR-8545-34: Type: IMPLANTABLE DEVICE | Site: ANKLE | Status: NON-FUNCTIONAL

## (undated) DEVICE — SU MONOCRYL 3-0 PS-1 27" Y936H

## (undated) DEVICE — SUCTION MANIFOLD DORNOCH ULTRA CART UL-CL500

## (undated) DEVICE — DRSG STERI STRIP 1/2X4" R1547

## (undated) DEVICE — GLOVE PROTEXIS W/NEU-THERA 8.5  2D73TE85

## (undated) DEVICE — SU MONOCRYL 3-0 PS-2 18" UND Y497G

## (undated) DEVICE — ESU GROUND PAD ADULT W/CORD E7507

## (undated) DEVICE — PACK EXTREMITY SOP15EXFSD

## (undated) DEVICE — SU VICRYL 3-0 PS-1 18" UND J683

## (undated) DEVICE — GLOVE PROTEXIS W/NEU-THERA 7.5  2D73TE75

## (undated) DEVICE — IMM LIMB ELEVATOR DC40-0203

## (undated) DEVICE — STPL SKIN 35W ROTATING HEAD PRW35

## (undated) DEVICE — NDL 18GA 1.5" 305196

## (undated) DEVICE — IMM KIT SHOULDER STABILIZATION 7210573

## (undated) DEVICE — BRUSH SURGICAL SCRUB W/4% CHLORHEXIDINE GLUCONATE SOL 4458A

## (undated) DEVICE — DRSG ABDOMINAL 07 1/2X8" 7197D

## (undated) DEVICE — ARTHROSCOPIC CANNULA TWIST-IN YELLOW 8.25X7CM AR-6530

## (undated) DEVICE — TUBING ARTHRO CONMED/LINVATEC PUMP BLUE INFLOW 10K100

## (undated) DEVICE — BUR SHAVER ARTHRO 6MM OVAL H9102

## (undated) DEVICE — GLOVE PROTEXIS POWDER FREE 8.5 ORTHOPEDIC 2D73ET85

## (undated) DEVICE — TUBING ARTHRO CONMED/LINVATEC PUMP PINK OUTFLOW 24K100

## (undated) DEVICE — Device

## (undated) DEVICE — NDL ARTHREX MULTIFIRE/FASTPASS SCORPION AR-13995N

## (undated) DEVICE — IMM KIT SHOULDER TMAX MASK FACE 7210559

## (undated) DEVICE — DRAPE U-POUCH 34X29" 1067

## (undated) DEVICE — STRAP KNEE/BODY 31143004

## (undated) DEVICE — CAST PADDING 4" STERILE 9044S

## (undated) DEVICE — SU LOOP #2 TIGERLOOP AR-7234T

## (undated) DEVICE — PAD ARMBOARD FOAM EGGCRATE COVIDEN 3114367

## (undated) DEVICE — SYR 10ML FINGER CONTROL W/O NDL 309695

## (undated) DEVICE — LINEN TOWEL PACK X5 5464

## (undated) DEVICE — SOL WATER IRRIG 1000ML BOTTLE 2F7114

## (undated) DEVICE — ESU ELEC VAPR PREMIER RF 90DEG 3.7MM 227204

## (undated) DEVICE — TAPE MICROFOAM 4" 1528-4

## (undated) DEVICE — BLADE SHAVER ARTHRO 4.2MM GREAT WHITE 9299A

## (undated) DEVICE — LINEN ORTHO PACK 5446

## (undated) DEVICE — GLOVE PROTEXIS BLUE W/NEU-THERA 7.0  2D73EB70

## (undated) DEVICE — GLOVE PROTEXIS W/NEU-THERA 6.5  2D73TE65

## (undated) DEVICE — ARTHROSCOPIC CANNULA TWIST-IN PURPLE 7MMX7CM AR-6570

## (undated) DEVICE — DRAPE U-DRAPE 1015NSD NON-STERILE

## (undated) DEVICE — PREP DURAPREP 26ML APL 8630

## (undated) DEVICE — COVER CAMERA IN-LIGHT DISP LT-C02

## (undated) DEVICE — GLOVE PROTEXIS MICRO 8.5  2D73PM85

## (undated) DEVICE — SUCTION CANISTER MEDIVAC LINER 3000ML W/LID 65651-530

## (undated) DEVICE — DRAPE SHEET REV FOLD 3/4 9349

## (undated) DEVICE — SOL NACL 0.9% IRRIG 3000ML BAG 2B7477

## (undated) RX ORDER — ACETAMINOPHEN 325 MG/1
TABLET ORAL
Status: DISPENSED
Start: 2017-04-19

## (undated) RX ORDER — FENTANYL CITRATE 50 UG/ML
INJECTION, SOLUTION INTRAMUSCULAR; INTRAVENOUS
Status: DISPENSED
Start: 2017-04-19

## (undated) RX ORDER — HYDROMORPHONE HYDROCHLORIDE 2 MG/1
TABLET ORAL
Status: DISPENSED
Start: 2017-04-19

## (undated) RX ORDER — FENTANYL CITRATE 50 UG/ML
INJECTION, SOLUTION INTRAMUSCULAR; INTRAVENOUS
Status: DISPENSED
Start: 2019-12-16

## (undated) RX ORDER — FENTANYL CITRATE 0.05 MG/ML
INJECTION, SOLUTION INTRAMUSCULAR; INTRAVENOUS
Status: DISPENSED
Start: 2019-12-16

## (undated) RX ORDER — CEFAZOLIN SODIUM IN 0.9 % NACL 3 G/100 ML
INTRAVENOUS SOLUTION, PIGGYBACK (ML) INTRAVENOUS
Status: DISPENSED
Start: 2019-12-16

## (undated) RX ORDER — LIDOCAINE HYDROCHLORIDE 20 MG/ML
INJECTION, SOLUTION EPIDURAL; INFILTRATION; INTRACAUDAL; PERINEURAL
Status: DISPENSED
Start: 2019-12-16

## (undated) RX ORDER — GABAPENTIN 300 MG/1
CAPSULE ORAL
Status: DISPENSED
Start: 2017-04-19

## (undated) RX ORDER — ONDANSETRON 2 MG/ML
INJECTION INTRAMUSCULAR; INTRAVENOUS
Status: DISPENSED
Start: 2019-12-16

## (undated) RX ORDER — PROPOFOL 10 MG/ML
INJECTION, EMULSION INTRAVENOUS
Status: DISPENSED
Start: 2019-12-16